# Patient Record
Sex: MALE | Race: WHITE | Employment: UNEMPLOYED | URBAN - METROPOLITAN AREA
[De-identification: names, ages, dates, MRNs, and addresses within clinical notes are randomized per-mention and may not be internally consistent; named-entity substitution may affect disease eponyms.]

---

## 2022-07-24 ENCOUNTER — HOSPITAL ENCOUNTER (EMERGENCY)
Age: 52
Discharge: HOME | End: 2022-07-24
Attending: EMERGENCY MEDICINE | Admitting: EMERGENCY MEDICINE
Payer: MEDICAID

## 2022-07-24 VITALS
BODY MASS INDEX: 41.68 KG/M2 | OXYGEN SATURATION: 94 % | HEART RATE: 80 BPM | SYSTOLIC BLOOD PRESSURE: 148 MMHG | RESPIRATION RATE: 16 BRPM | TEMPERATURE: 98.6 F | DIASTOLIC BLOOD PRESSURE: 88 MMHG | HEIGHT: 68 IN | WEIGHT: 275 LBS

## 2022-07-24 DIAGNOSIS — S90.821A BLISTER (NONTHERMAL), RIGHT FOOT, INITIAL ENCOUNTER: Primary | ICD-10-CM

## 2022-07-24 PROCEDURE — 6360000002 HC RX W HCPCS: Performed by: EMERGENCY MEDICINE

## 2022-07-24 PROCEDURE — 99284 EMERGENCY DEPT VISIT MOD MDM: CPT

## 2022-07-24 PROCEDURE — 90714 TD VACC NO PRESV 7 YRS+ IM: CPT | Performed by: EMERGENCY MEDICINE

## 2022-07-24 PROCEDURE — 90471 IMMUNIZATION ADMIN: CPT | Performed by: EMERGENCY MEDICINE

## 2022-07-24 RX ORDER — CIPROFLOXACIN 500 MG/1
500 TABLET, FILM COATED ORAL 2 TIMES DAILY
Qty: 14 TABLET | Refills: 0 | Status: SHIPPED | OUTPATIENT
Start: 2022-07-24 | End: 2022-07-31

## 2022-07-24 RX ORDER — TETANUS AND DIPHTHERIA TOXOIDS ADSORBED 2; 2 [LF]/.5ML; [LF]/.5ML
0.5 INJECTION INTRAMUSCULAR
Status: COMPLETED | OUTPATIENT
Start: 2022-07-24 | End: 2022-07-24

## 2022-07-24 RX ORDER — DICLOFENAC SODIUM 75 MG/1
75 TABLET, DELAYED RELEASE ORAL 2 TIMES DAILY
Qty: 10 TABLET | Refills: 0 | Status: SHIPPED | OUTPATIENT
Start: 2022-07-24 | End: 2022-07-29

## 2022-07-24 RX ORDER — ATENOLOL AND CHLORTHALIDONE TABLET 50; 25 MG/1; MG/1
1 TABLET ORAL DAILY
COMMUNITY

## 2022-07-24 RX ADMIN — TETANUS AND DIPHTHERIA TOXOIDS ADSORBED 0.5 ML: 2; 2 INJECTION INTRAMUSCULAR at 13:46

## 2022-07-24 ASSESSMENT — PAIN - FUNCTIONAL ASSESSMENT
PAIN_FUNCTIONAL_ASSESSMENT: 0-10
PAIN_FUNCTIONAL_ASSESSMENT: 0-10

## 2022-07-24 ASSESSMENT — PAIN DESCRIPTION - LOCATION
LOCATION: FOOT
LOCATION: FOOT

## 2022-07-24 ASSESSMENT — ENCOUNTER SYMPTOMS
BACK PAIN: 0
VOMITING: 0
SHORTNESS OF BREATH: 0

## 2022-07-24 ASSESSMENT — PAIN SCALES - GENERAL
PAINLEVEL_OUTOF10: 5
PAINLEVEL_OUTOF10: 5

## 2022-07-24 ASSESSMENT — PAIN DESCRIPTION - DESCRIPTORS: DESCRIPTORS: DISCOMFORT

## 2022-07-24 ASSESSMENT — PAIN DESCRIPTION - ORIENTATION
ORIENTATION: RIGHT
ORIENTATION: RIGHT

## 2022-07-24 NOTE — ED TRIAGE NOTES
Pt presents to the ED with c/o foot pain/skin issue. [place] and is unsure if he hurt his foot then. HE states he has nerve damage in his feet so he didn't realize he had an issue until he looked at the bottom of his foot yesterday and saw he had a huge blister looking are that had busted open. Denies fever. Masked.

## 2022-07-24 NOTE — ED NOTES
SKIN FROM RUPTURED BLISTERS ON BOTTOM OF RIGHT FOOT TRIMMED OFF PER DR SHELTON'S INSTRUCTIONS. NON-ADHERENT DRESSING APPLIED TO BOTH FEET.      Zoe Granados RN  07/24/22 1400

## 2022-07-24 NOTE — ED PROVIDER NOTES
[facility] Emergency Department Provider Note                   PCP:                Elvis Gerardo MD               Age: 54 y.o. Sex: male       ICD-10-CM    1. Blister (nonthermal), right foot, initial encounter  S90.821A           DISPOSITION Decision To Discharge 07/24/2022 01:30:50 PM        MDM  Number of Diagnoses or Management Options  Blister (nonthermal), right foot, initial encounter  Diagnosis management comments: Tetanus has been updated. Nurse staff has debrided the blister on the right foot. Dressing with Neosporin applied. Will place on Cipro due to having diabetes. Amount and/or Complexity of Data Reviewed  Tests in the medicine section of CPT®: ordered and reviewed    Risk of Complications, Morbidity, and/or Mortality  Presenting problems: low  Diagnostic procedures: low  Management options: low         Orders Placed This Encounter   Procedures    Apply dressing        Christi Tsai is a 54 y.o. male who presents to the Emergency Department with chief complaint of    Chief Complaint   Patient presents with    Foot Pain      14-year-old white male history of diabetes with neuropathy in his legs reports that Friday he spent several hours barefoot climbing through a creek in [place]. Today he noticed blisters on his feet and skin peeling off the bottom of his right foot. Tetanus status is unknown. The history is provided by the patient. Review of Systems   Constitutional:  Negative for fever. Respiratory:  Negative for shortness of breath. Gastrointestinal:  Negative for vomiting. Musculoskeletal:  Negative for back pain. Neurological:  Negative for headaches. All other systems reviewed and are negative. Past Medical History:   Diagnosis Date    Diabetes mellitus ([address]     Hyperlipidemia     Hypertension         Past Surgical History:   Procedure Laterality Date    HERNIA REPAIR      ORTHOPEDIC SURGERY          History reviewed.  No pertinent family history. Social History     Socioeconomic History    Marital status: Single     Spouse name: None    Number of children: None    Years of education: None    Highest education level: None   Tobacco Use    Smoking status: Every Day     Packs/day: 0.50     Types: Cigarettes    Smokeless tobacco: Never   Substance and Sexual Activity    Alcohol use: Never    Drug use: Never         Patient has no known allergies. Discharge Medication List as of 7/24/2022  2:01 PM        CONTINUE these medications which have NOT CHANGED    Details   atenolol-chlorthalidone (TENORETIC) 50-25 MG per tablet Take 1 tablet by mouth in the morning. Historical Med              Vitals signs and nursing note reviewed. No data found. Physical Exam  Vitals and nursing note reviewed. Constitutional:       General: He is not in acute distress. Appearance: Normal appearance. He is not toxic-appearing. HENT:      Head: Normocephalic and atraumatic. Cardiovascular:      Rate and Rhythm: Normal rate. Pulmonary:      Effort: Pulmonary effort is normal.   Musculoskeletal:      Cervical back: Normal range of motion. Comments: Patient has several intact blisters plantar aspect of his left foot. There is a large blister involving the entire anterior aspect of the plantar surface of the right foot. This blister has broken open. Skin:     General: Skin is warm and dry. Neurological:      Mental Status: He is alert and oriented to person, place, and time. Psychiatric:         Mood and Affect: Mood normal.         Behavior: Behavior normal.        Procedures      Labs Reviewed - No data to display     No orders to display                          Voice dictation software was used during the making of this note. This software is not perfect and grammatical and other typographical errors may be present. This note has not been completely proofread for errors.      Jaci Vázquez MD  07/24/22 0613

## 2022-07-24 NOTE — ED NOTES
I have reviewed discharge instructions with the patient. The patient verbalized understanding. Patient left ED via Discharge Method: ambulatory to Home with HIMSELF. Opportunity for questions and clarification provided. Patient given 2 scripts. To continue your aftercare when you leave the hospital, you may receive an automated call from our care team to check in on how you are doing. This is a free service and part of our promise to provide the best care and service to meet your aftercare needs.  If you have questions, or wish to unsubscribe from this service please call [phone]. Thank you for Choosing our [facility] Emergency Department.        Moshe Murillo RN  07/24/22 2572

## 2023-02-05 ENCOUNTER — HOSPITAL ENCOUNTER (EMERGENCY)
Age: 53
Discharge: HOME | End: 2023-02-06
Attending: EMERGENCY MEDICINE
Payer: MEDICAID

## 2023-02-05 VITALS
HEIGHT: 68 IN | WEIGHT: 270 LBS | RESPIRATION RATE: 20 BRPM | TEMPERATURE: 98.4 F | BODY MASS INDEX: 40.92 KG/M2 | OXYGEN SATURATION: 97 % | SYSTOLIC BLOOD PRESSURE: 156 MMHG | HEART RATE: 90 BPM | DIASTOLIC BLOOD PRESSURE: 108 MMHG

## 2023-02-05 DIAGNOSIS — M25.571 CHRONIC PAIN OF RIGHT ANKLE: Primary | ICD-10-CM

## 2023-02-05 DIAGNOSIS — G89.29 CHRONIC PAIN OF RIGHT ANKLE: Primary | ICD-10-CM

## 2023-02-05 LAB
GLUCOSE BLD STRIP.AUTO-MCNC: 147 MG/DL (ref 65–100)
SERVICE CMNT-IMP: ABNORMAL

## 2023-02-05 PROCEDURE — 82962 GLUCOSE BLOOD TEST: CPT

## 2023-02-05 PROCEDURE — 99283 EMERGENCY DEPT VISIT LOW MDM: CPT

## 2023-02-05 ASSESSMENT — PAIN DESCRIPTION - PAIN TYPE: TYPE: ACUTE PAIN

## 2023-02-05 ASSESSMENT — PAIN SCALES - GENERAL: PAINLEVEL_OUTOF10: 10

## 2023-02-05 ASSESSMENT — PAIN DESCRIPTION - DESCRIPTORS: DESCRIPTORS: ACHING

## 2023-02-05 ASSESSMENT — PAIN - FUNCTIONAL ASSESSMENT: PAIN_FUNCTIONAL_ASSESSMENT: 0-10

## 2023-02-05 ASSESSMENT — PAIN DESCRIPTION - LOCATION: LOCATION: ANKLE

## 2023-02-05 ASSESSMENT — PAIN DESCRIPTION - FREQUENCY: FREQUENCY: CONTINUOUS

## 2023-02-05 ASSESSMENT — PAIN DESCRIPTION - ORIENTATION: ORIENTATION: RIGHT

## 2023-02-05 ASSESSMENT — LIFESTYLE VARIABLES: HOW OFTEN DO YOU HAVE A DRINK CONTAINING ALCOHOL: NEVER

## 2023-02-06 ENCOUNTER — APPOINTMENT (OUTPATIENT)
Dept: GENERAL RADIOLOGY | Age: 53
End: 2023-02-06
Payer: MEDICAID

## 2023-02-06 PROBLEM — M25.571 CHRONIC PAIN OF RIGHT ANKLE: Status: ACTIVE | Noted: 2023-02-06

## 2023-02-06 PROBLEM — G89.29 CHRONIC PAIN OF RIGHT ANKLE: Status: ACTIVE | Noted: 2023-02-06

## 2023-02-06 PROCEDURE — 73610 X-RAY EXAM OF ANKLE: CPT

## 2023-02-06 PROCEDURE — 73630 X-RAY EXAM OF FOOT: CPT

## 2023-02-06 RX ORDER — MELOXICAM 7.5 MG/1
7.5 TABLET ORAL 2 TIMES DAILY PRN
Qty: 30 TABLET | Refills: 0 | Status: SHIPPED | OUTPATIENT
Start: 2023-02-06

## 2023-02-06 ASSESSMENT — ENCOUNTER SYMPTOMS
EYE DISCHARGE: 0
DIARRHEA: 0
COUGH: 0
CHEST TIGHTNESS: 0
NAUSEA: 0
ABDOMINAL PAIN: 0
SHORTNESS OF BREATH: 0
VOMITING: 0
BACK PAIN: 0

## 2023-02-06 NOTE — ED TRIAGE NOTES
Ambulatory with difficulty into triage. Reports right ankle pain x1 week. Unknown injury however reports \"My balance is bad and I fall a lot\". Denies other injury from falls. Denies blood thinners. HTN on arrival, non-compliant with meds.  Also reports non-compliant with insulin

## 2023-02-06 NOTE — ED PROVIDER NOTES
Emergency Department Provider Note                   PCP:                Chanell John MD               Age: 50 y.o. Sex: male       ICD-10-CM    1. Chronic pain of right ankle  M25.571     G89.29           DISPOSITION Decision To Discharge 02/06/2023 01:59:26 AM        Medical Decision Making  82-year-old male presents emerged department via private vehicle with chief complaint of chronic right ankle pain. He reports acute on chronic pain for the past 1 week in duration. He denies any alleviating aggravating factors. Symptoms been constant. He reports having pretty severe neuropathy and has complete numbness in the foot so is unsure if he has had any breaks he reports multiple falls this week. Vital signs here are reviewed. Patient is neurovascular intact. I cannot elicit any pinpoint tenderness. X-rays of the ankle and foot are obtained. These were reviewed by myself and agrees with the radiologist which showed no evidence of any acute fracture or dislocation. He does have an old healing medial malleolus fracture. Patient is ambulatory for us here in the emergency department. I have offered to wrap his foot but he declined. At this point told him he should follow-up with his primary care doctor and could potentially be on the care of a podiatrist.  Patient was given return precautions patient stable discharge examination. Amount and/or Complexity of Data Reviewed  Labs: ordered. Radiology: ordered. Complexity of Problem: 1 or more chronic illnesses with severe exacerbation. (5)  The patients assessment required an independent historian: none  I have conducted an independent ordering and review of X-rays. I have reviewed records from an external source: ED records from outside this hospital.  I have reviewed records from an external source: provider visit notes from PCP.   I have reviewed records from an external source: provider visit notes from outside specialist.  Considerations: Shared decision making was utilized in the care of this patient. Social determinant affecting care: none  Evidence based risk calculation performed: [place] ankle   I discussed case with patient  Home with pcp follow up          Orders Placed This Encounter   Procedures    XR ANKLE RIGHT (MIN 3 VIEWS)    XR FOOT RIGHT (MIN 3 VIEWS)    POCT Glucose    POCT Glucose        Medications - No data to display    New Prescriptions    No medications on file        Angelica Ware is a 50 y.o. male who presents to the Emergency Department with chief complaint of    Chief Complaint   Patient presents with    Ankle Pain      62-year-old male presents emerged department via private vehicle with chief complaint of chronic right ankle pain. He reports acute on chronic pain for the past 1 week in duration. He denies any alleviating aggravating factors. Symptoms been constant. He reports having pretty severe neuropathy and has complete numbness in the foot so is unsure if he has had any breaks he reports multiple falls this week. Review of Systems   Constitutional:  Negative for chills, fatigue and fever. HENT:  Negative for congestion, dental problem and drooling. Eyes:  Negative for discharge. Respiratory:  Negative for cough, chest tightness and shortness of breath. Cardiovascular:  Negative for chest pain and palpitations. Gastrointestinal:  Negative for abdominal pain, diarrhea, nausea and vomiting. Endocrine: Negative for polydipsia. Genitourinary:  Negative for difficulty urinating, dysuria and hematuria. Musculoskeletal:  Negative for back pain. Positive right foot and ankle pain. Skin:  Negative for rash and wound. Neurological:  Negative for dizziness, seizures, speech difficulty, light-headedness and headaches. Psychiatric/Behavioral:  Negative for agitation.       Past Medical History:   Diagnosis Date    Diabetes mellitus ([facility] [address]     Hyperlipidemia Hypertension         Past Surgical History:   Procedure Laterality Date    HERNIA REPAIR      ORTHOPEDIC SURGERY          No family history on file. Social History     Socioeconomic History    Marital status:    Tobacco Use    Smoking status: Every Day     Packs/day: 0.50     Types: Cigarettes    Smokeless tobacco: Never   Substance and Sexual Activity    Alcohol use: Never    Drug use: Never         Patient has no known allergies. Previous Medications    ATENOLOL-CHLORTHALIDONE (TENORETIC) 50-25 MG PER TABLET    Take 1 tablet by mouth in the morning. DICLOFENAC (VOLTAREN) 75 MG EC TABLET    Take 1 tablet by mouth in the morning and 1 tablet before bedtime. Do all this for 5 days. Vitals signs and nursing note reviewed. Patient Vitals for the past 4 hrs:   Temp Pulse Resp BP SpO2   02/05/23 2329 98.4 °F (36.9 °C) 90 20 (!) 156/108 97 %          Physical Exam  Vitals and nursing note reviewed. Constitutional:       Appearance: Normal appearance. HENT:      Head: Normocephalic and atraumatic. Right Ear: Tympanic membrane normal.      Nose: Nose normal.      Mouth/Throat:      Mouth: Mucous membranes are moist.   Eyes:      Extraocular Movements: Extraocular movements intact. Pupils: Pupils are equal, round, and reactive to light. Cardiovascular:      Rate and Rhythm: Normal rate and regular rhythm. Pulses: Normal pulses. Heart sounds: No murmur heard. Pulmonary:      Effort: Pulmonary effort is normal. No respiratory distress. Breath sounds: Normal breath sounds. Abdominal:      General: There is no distension. Palpations: Abdomen is soft. Tenderness: There is no abdominal tenderness. There is no guarding. Musculoskeletal:         General: No swelling or tenderness. Normal range of motion. Cervical back: Normal range of motion and neck supple. No rigidity or tenderness. Skin:     General: Skin is warm and dry.       Capillary Refill: Capillary refill takes less than 2 seconds. Neurological:      General: No focal deficit present. Mental Status: He is alert and oriented to person, place, and time. Mental status is at baseline. Psychiatric:         Behavior: Behavior normal.        Procedures    Results for orders placed or performed during the hospital encounter of 02/05/23   XR ANKLE RIGHT (MIN 3 VIEWS)    Narrative    EXAMINATION: Right foot    HISTORY: right ankle and foot pain, diabetic, multiple falls this week. TECHNIQUE: Frontal, lateral, and oblique views of the right foot. COMPARISON: None available. FINDINGS:   There is no evidence of acute fracture or dislocation. Joint spaces are maintained. Soft tissues are within normal limits. No radiopaque foreign body. Impression    No evidence of acute fracture or dislocation within the right foot. EXAMINATION: Right Ankle    HISTORY: right ankle and foot pain, diabetic, multiple falls this week. TECHNIQUE: Frontal, lateral, and oblique views of the right ankle. COMPARISON: None available. FINDINGS:   There is no evidence of acute fracture or dislocation. The appearance of the medial malleolus suggests a healed remote fracture. Joint spaces are maintained. There is mild soft tissue swelling. No radiopaque foreign body. IMPRESSION:  No evidence of acute fracture or dislocation within the right ankle. Mild soft  tissue swelling. XR FOOT RIGHT (MIN 3 VIEWS)    Narrative    EXAMINATION: Right foot    HISTORY: right ankle and foot pain, diabetic, multiple falls this week. TECHNIQUE: Frontal, lateral, and oblique views of the right foot. COMPARISON: None available. FINDINGS:   There is no evidence of acute fracture or dislocation. Joint spaces are maintained. Soft tissues are within normal limits. No radiopaque foreign body.       Impression    No evidence of acute fracture or dislocation within the right foot.                EXAMINATION: Right Ankle    HISTORY: right ankle and foot pain, diabetic, multiple falls this week. TECHNIQUE: Frontal, lateral, and oblique views of the right ankle. COMPARISON: None available. FINDINGS:   There is no evidence of acute fracture or dislocation. The appearance of the medial malleolus suggests a healed remote fracture. Joint spaces are maintained. There is mild soft tissue swelling. No radiopaque foreign body. IMPRESSION:  No evidence of acute fracture or dislocation within the right ankle. Mild soft  tissue swelling. POCT Glucose   Result Value Ref Range    POC Glucose 147 (H) 65 - 100 mg/dL    Performed by: Gianna         XR ANKLE RIGHT (MIN 3 VIEWS)   Final Result   No evidence of acute fracture or dislocation within the right foot. EXAMINATION: Right Ankle      HISTORY: right ankle and foot pain, diabetic, multiple falls this week. TECHNIQUE: Frontal, lateral, and oblique views of the right ankle. COMPARISON: None available. FINDINGS:    There is no evidence of acute fracture or dislocation. The appearance of the medial malleolus suggests a healed remote fracture. Joint spaces are maintained. There is mild soft tissue swelling. No radiopaque foreign body. IMPRESSION:   No evidence of acute fracture or dislocation within the right ankle. Mild soft   tissue swelling. XR FOOT RIGHT (MIN 3 VIEWS)   Final Result   No evidence of acute fracture or dislocation within the right foot. EXAMINATION: Right Ankle      HISTORY: right ankle and foot pain, diabetic, multiple falls this week. TECHNIQUE: Frontal, lateral, and oblique views of the right ankle. COMPARISON: None available. FINDINGS:    There is no evidence of acute fracture or dislocation. The appearance of the medial malleolus suggests a healed remote fracture. Joint spaces are maintained. There is mild soft tissue swelling. No radiopaque foreign body. IMPRESSION:   No evidence of acute fracture or dislocation within the right ankle. Mild soft   tissue swelling. Voice dictation software was used during the making of this note. This software is not perfect and grammatical and other typographical errors may be present. This note has not been completely proofread for errors.      Sylvia Bravo,   02/06/23 0201

## 2023-02-06 NOTE — DISCHARGE INSTRUCTIONS
No evidence of any fractures on your x-rays here. Please follow-up with your primary care doctor you should also probably be under the care of a podiatrist if you are having chronic foot and ankle pain.

## 2023-02-06 NOTE — ED NOTES
I have reviewed discharge instructions with the patient. The patient verbalized understanding. Patient left ED via Discharge Method: ambulatory to Home with ( self). Opportunity for questions and clarification provided. Patient given 1 scripts. To continue your aftercare when you leave the hospital, you may receive an automated call from our care team to check in on how you are doing. This is a free service and part of our promise to provide the best care and service to meet your aftercare needs.  If you have questions, or wish to unsubscribe from this service please call [phone]. Thank you for Choosing our [facility] Emergency Department.        Ronnie Uriarte RN  02/06/23 1351

## 2023-05-22 ENCOUNTER — HOSPITAL ENCOUNTER (EMERGENCY)
Age: 53
Discharge: HOME | End: 2023-05-22
Attending: EMERGENCY MEDICINE
Payer: MEDICAID

## 2023-05-22 ENCOUNTER — APPOINTMENT (OUTPATIENT)
Dept: GENERAL RADIOLOGY | Age: 53
End: 2023-05-22
Payer: MEDICAID

## 2023-05-22 VITALS
WEIGHT: 270 LBS | HEIGHT: 66 IN | OXYGEN SATURATION: 99 % | DIASTOLIC BLOOD PRESSURE: 93 MMHG | SYSTOLIC BLOOD PRESSURE: 154 MMHG | TEMPERATURE: 98.4 F | BODY MASS INDEX: 43.39 KG/M2 | RESPIRATION RATE: 17 BRPM | HEART RATE: 91 BPM

## 2023-05-22 DIAGNOSIS — M19.021 OSTEOARTHRITIS OF RIGHT ELBOW, UNSPECIFIED OSTEOARTHRITIS TYPE: Primary | ICD-10-CM

## 2023-05-22 PROBLEM — I10 HYPERTENSION: Status: ACTIVE | Noted: 2017-06-16

## 2023-05-22 PROBLEM — M47.816 LUMBAR FACET ARTHROPATHY: Status: ACTIVE | Noted: 2023-05-22

## 2023-05-22 PROBLEM — M79.2 NEURALGIA AND NEURITIS, UNSPECIFIED: Status: ACTIVE | Noted: 2023-05-22

## 2023-05-22 PROBLEM — M54.12 CERVICAL RADICULOPATHY: Status: ACTIVE | Noted: 2023-05-22

## 2023-05-22 PROCEDURE — 6360000002 HC RX W HCPCS

## 2023-05-22 PROCEDURE — 96372 THER/PROPH/DIAG INJ SC/IM: CPT

## 2023-05-22 PROCEDURE — 99284 EMERGENCY DEPT VISIT MOD MDM: CPT

## 2023-05-22 PROCEDURE — 6370000000 HC RX 637 (ALT 250 FOR IP)

## 2023-05-22 PROCEDURE — 73080 X-RAY EXAM OF ELBOW: CPT

## 2023-05-22 RX ORDER — ONDANSETRON 4 MG/1
4 TABLET, ORALLY DISINTEGRATING ORAL
Status: COMPLETED | OUTPATIENT
Start: 2023-05-22 | End: 2023-05-22

## 2023-05-22 RX ORDER — PREDNISONE 20 MG/1
20 TABLET ORAL DAILY
Qty: 5 TABLET | Refills: 0 | Status: SHIPPED | OUTPATIENT
Start: 2023-05-22 | End: 2023-05-27

## 2023-05-22 RX ORDER — KETOROLAC TROMETHAMINE 30 MG/ML
30 INJECTION, SOLUTION INTRAMUSCULAR; INTRAVENOUS
Status: COMPLETED | OUTPATIENT
Start: 2023-05-22 | End: 2023-05-22

## 2023-05-22 RX ORDER — MORPHINE SULFATE 4 MG/ML
2 INJECTION INTRAVENOUS ONCE
Status: COMPLETED | OUTPATIENT
Start: 2023-05-22 | End: 2023-05-22

## 2023-05-22 RX ORDER — NAPROXEN 500 MG/1
500 TABLET ORAL 2 TIMES DAILY
Qty: 14 TABLET | Refills: 0 | Status: SHIPPED | OUTPATIENT
Start: 2023-05-22 | End: 2023-05-29

## 2023-05-22 RX ORDER — KETOROLAC TROMETHAMINE 30 MG/ML
30 INJECTION, SOLUTION INTRAMUSCULAR; INTRAVENOUS ONCE
Status: DISCONTINUED | OUTPATIENT
Start: 2023-05-22 | End: 2023-05-22

## 2023-05-22 RX ADMIN — MORPHINE SULFATE 2 MG: 4 INJECTION INTRAVENOUS at 22:48

## 2023-05-22 RX ADMIN — ONDANSETRON 4 MG: 4 TABLET, ORALLY DISINTEGRATING ORAL at 22:44

## 2023-05-22 RX ADMIN — KETOROLAC TROMETHAMINE 30 MG: 30 INJECTION, SOLUTION INTRAMUSCULAR at 22:49

## 2023-05-22 ASSESSMENT — PAIN SCALES - GENERAL
PAINLEVEL_OUTOF10: 10
PAINLEVEL_OUTOF10: 4
PAINLEVEL_OUTOF10: 10

## 2023-05-22 ASSESSMENT — PAIN DESCRIPTION - ORIENTATION
ORIENTATION: RIGHT

## 2023-05-22 ASSESSMENT — PAIN DESCRIPTION - LOCATION
LOCATION: ELBOW

## 2023-05-22 ASSESSMENT — PAIN - FUNCTIONAL ASSESSMENT: PAIN_FUNCTIONAL_ASSESSMENT: 0-10

## 2023-05-23 NOTE — ED NOTES
I have reviewed discharge instructions with the patient. The patient verbalized understanding. Patient left ED via Discharge Method: ambulatory to Home with self    Opportunity for questions and clarification provided. Patient given 2 scripts. To continue your aftercare when you leave the hospital, you may receive an automated call from our care team to check in on how you are doing. This is a free service and part of our promise to provide the best care and service to meet your aftercare needs.  If you have questions, or wish to unsubscribe from this service please call [phone]. Thank you for Choosing our [facility] Emergency Department.         Nini Mistry RN  05/22/23 0892

## 2023-05-23 NOTE — ED PROVIDER NOTES
Emergency Department Provider Note       PCP: Johan Yeung MD   Age: 50 y.o. Sex: male     DISPOSITION Decision To Discharge 05/22/2023 10:48:26 PM       ICD-10-CM    1. Osteoarthritis of right elbow, unspecified osteoarthritis type  M19.021 naproxen (NAPROSYN) 500 MG tablet     predniSONE (DELTASONE) 20 MG tablet          Medical Decision Making     Complexity of Problems Addressed:  Complexity of Problem: 1 acute, uncomplicated illness or injury. Data Reviewed and Analyzed:  Category 1:   I independently ordered and reviewed each unique test.  I reviewed external records: ED visit note from an outside group. I reviewed external records: previous imaging study including radiologist interpretation. Category 2:   I interpreted the X-rays. X-ray right elbow is negative for fracture or dislocation or acute bony abnormality. I am in agreement with radiologist interpretation    Category 3: Discussion of management or test interpretation. Vital signs reviewed, patient stable, NAD, afebrile, nontoxic in appearance     Patient's blood pressure is elevated today at 170/101. He has not taken any blood pressure medications today. Denies any chest pain or shortness of breath headache or dizziness. Asymptomatic hypertension    In summary this is a 56-year-old male with chronic bilateral upper extremity pain, cervicalgia who presents emergency department today with chief complaint of posterior right elbow pain for the past week. Patient denies any known injury or trauma to the area. On physical exam.  Patient is neurovascularly intact. No erythema, no swelling, no induration or warmth to the joint.     Review of PDMP demonstrates that on 5 3 of this month, patient was prescribed a 30-day supply of 20 mg oxycodone and a quantity of 90    We will obtain x-rays of right elbow today as patient states he does have chronic falls due to his chronic neuralgia and neuritis    We will administer IM Toradol

## 2023-05-23 NOTE — ED TRIAGE NOTES
Pt ambulatory to triage with steady gait. Pt c/o right elbow pain that radiates down his arm. States pain started alittle over a weak ago, pt reports pain has been getting \"worse and worse\". Pt states he has nerve damage and falls often, but is unable to relate his current arm pain to any recent particular fall. Pt rates elbow pain 10/10 pain.

## 2023-05-23 NOTE — DISCHARGE INSTRUCTIONS
You were evaluated in the emergency department today for right elbow pain    X-rays of your elbow are negative for fracture. You do have osteoarthritis of your right elbow    No indication of infection in your elbow. Your physical exam is reassuring. You are given injection of morphine and Toradol here in the emergency department along with some Zofran for nausea. Your wife is to be driving him home today    I have written you a prescription for an anti-inflammatory that is an NSAID called Naprosyn. Take this medication twice daily with food do not take other NSAIDs such as ibuprofen, Motrin, Aleve, Mobic while taking this medication    I have written you a low-dose steroid called prednisone to be started tomorrow.     Contact your primary care provider and your neurologic provider tomorrow to schedule follow-up within the next week    Return the emergency department for chest pain, shortness of breath, signs and symptoms of stroke as listed in your discharge paperwork, swelling or redness or warmth to your elbow along with fevers

## 2025-04-30 ENCOUNTER — APPOINTMENT (OUTPATIENT)
Dept: GENERAL RADIOLOGY | Age: 55
End: 2025-04-30
Payer: MEDICAID

## 2025-04-30 ENCOUNTER — HOSPITAL ENCOUNTER (INPATIENT)
Age: 55
LOS: 3 days | Discharge: HOME | End: 2025-05-03
Attending: EMERGENCY MEDICINE | Admitting: INTERNAL MEDICINE
Payer: MEDICAID

## 2025-04-30 DIAGNOSIS — J18.9 PNEUMONIA OF LEFT LOWER LOBE DUE TO INFECTIOUS ORGANISM: ICD-10-CM

## 2025-04-30 DIAGNOSIS — E87.1 HYPONATREMIA: ICD-10-CM

## 2025-04-30 DIAGNOSIS — A41.9 SEPSIS, DUE TO UNSPECIFIED ORGANISM, UNSPECIFIED WHETHER ACUTE ORGAN DYSFUNCTION PRESENT (HCC): Primary | ICD-10-CM

## 2025-04-30 DIAGNOSIS — R74.01 ELEVATED AST (SGOT): ICD-10-CM

## 2025-04-30 PROBLEM — R31.9 HEMATURIA: Status: ACTIVE | Noted: 2025-04-30

## 2025-04-30 PROBLEM — J15.9 BACTERIAL PNEUMONIA: Status: ACTIVE | Noted: 2025-04-30

## 2025-04-30 PROBLEM — N39.0 UTI (URINARY TRACT INFECTION): Status: ACTIVE | Noted: 2025-04-30

## 2025-04-30 PROBLEM — D69.6 THROMBOCYTOPENIA: Status: ACTIVE | Noted: 2025-04-30

## 2025-04-30 LAB
ACB COMPLEX DNA BLD POS QL NAA+NON-PROBE: NOT DETECTED
ACCESSION NUMBER, LLC1M: ABNORMAL
ALBUMIN SERPL-MCNC: 2.9 G/DL (ref 3.5–5)
ALBUMIN/GLOB SERPL: 0.7 (ref 1–1.9)
ALP SERPL-CCNC: 77 U/L (ref 40–129)
ALT SERPL-CCNC: 39 U/L (ref 8–55)
ANION GAP SERPL CALC-SCNC: 14 MMOL/L (ref 7–16)
APPEARANCE UR: CLEAR
AST SERPL-CCNC: 68 U/L (ref 15–37)
B FRAGILIS DNA BLD POS QL NAA+NON-PROBE: NOT DETECTED
B PERT DNA SPEC QL NAA+PROBE: NOT DETECTED
BACTERIA URNS QL MICRO: NEGATIVE /HPF
BASOPHILS # BLD: 0.05 K/UL (ref 0–0.2)
BASOPHILS NFR BLD: 0.4 % (ref 0–2)
BILIRUB SERPL-MCNC: 0.8 MG/DL (ref 0–1.2)
BILIRUB UR QL: NEGATIVE
BIOFIRE TEST COMMENT: ABNORMAL
BLACTX-M ISLT/SPM QL: NOT DETECTED
BLAIMP ISLT/SPM QL: NOT DETECTED
BLAKPC ISLT/SPM QL: NOT DETECTED
BLAOXA-48-LIKE ISLT/SPM QL: NOT DETECTED
BLAVIM ISLT/SPM QL: NOT DETECTED
BORDETELLA PARAPERTUSSIS BY PCR: NOT DETECTED
BUN SERPL-MCNC: 18 MG/DL (ref 6–23)
C ALBICANS DNA BLD POS QL NAA+NON-PROBE: NOT DETECTED
C AURIS DNA BLD POS QL NAA+NON-PROBE: NOT DETECTED
C GATTII+NEOFOR DNA BLD POS QL NAA+N-PRB: NOT DETECTED
C GLABRATA DNA BLD POS QL NAA+NON-PROBE: NOT DETECTED
C KRUSEI DNA BLD POS QL NAA+NON-PROBE: NOT DETECTED
C PARAP DNA BLD POS QL NAA+NON-PROBE: NOT DETECTED
C PNEUM DNA SPEC QL NAA+PROBE: NOT DETECTED
C TROPICLS DNA BLD POS QL NAA+NON-PROBE: NOT DETECTED
CALCIUM SERPL-MCNC: 8.6 MG/DL (ref 8.8–10.2)
CHLORIDE SERPL-SCNC: 101 MMOL/L (ref 98–107)
CO2 SERPL-SCNC: 20 MMOL/L (ref 20–29)
COLOR UR: ABNORMAL
CREAT SERPL-MCNC: 0.99 MG/DL (ref 0.8–1.3)
DIFFERENTIAL METHOD BLD: ABNORMAL
E CLOAC COMP DNA BLD POS NAA+NON-PROBE: NOT DETECTED
E COLI DNA BLD POS QL NAA+NON-PROBE: NOT DETECTED
E FAECALIS DNA BLD POS QL NAA+NON-PROBE: NOT DETECTED
E FAECIUM DNA BLD POS QL NAA+NON-PROBE: NOT DETECTED
EKG ATRIAL RATE: 123 BPM
EKG DIAGNOSIS: NORMAL
EKG P AXIS: 62 DEGREES
EKG P-R INTERVAL: 148 MS
EKG Q-T INTERVAL: 323 MS
EKG QRS DURATION: 86 MS
EKG QTC CALCULATION (BAZETT): 461 MS
EKG R AXIS: -11 DEGREES
EKG T AXIS: 91 DEGREES
EKG VENTRICULAR RATE: 122 BPM
ENTEROBACTERALES DNA BLD POS NAA+N-PRB: DETECTED
EOSINOPHIL # BLD: 0 K/UL (ref 0–0.8)
EOSINOPHIL NFR BLD: 0 % (ref 0.5–7.8)
EPI CELLS #/AREA URNS HPF: ABNORMAL /HPF
ERYTHROCYTE [DISTWIDTH] IN BLOOD BY AUTOMATED COUNT: 15.2 % (ref 11.9–14.6)
FLUAV SUBTYP SPEC NAA+PROBE: NOT DETECTED
FLUBV RNA SPEC QL NAA+PROBE: NOT DETECTED
GLOBULIN SER CALC-MCNC: 4.1 G/DL (ref 2.3–3.5)
GLUCOSE SERPL-MCNC: 140 MG/DL (ref 70–99)
GLUCOSE UR STRIP.AUTO-MCNC: NEGATIVE MG/DL
GP B STREP DNA BLD POS QL NAA+NON-PROBE: NOT DETECTED
HADV DNA SPEC QL NAA+PROBE: NOT DETECTED
HAEM INFLU DNA BLD POS QL NAA+NON-PROBE: NOT DETECTED
HCOV 229E RNA SPEC QL NAA+PROBE: NOT DETECTED
HCOV HKU1 RNA SPEC QL NAA+PROBE: NOT DETECTED
HCOV NL63 RNA SPEC QL NAA+PROBE: NOT DETECTED
HCOV OC43 RNA SPEC QL NAA+PROBE: NOT DETECTED
HCT VFR BLD AUTO: 44.4 % (ref 41.1–50.3)
HGB BLD-MCNC: 15.1 G/DL (ref 13.6–17.2)
HGB UR QL STRIP: ABNORMAL
HMPV RNA SPEC QL NAA+PROBE: NOT DETECTED
HPIV1 RNA SPEC QL NAA+PROBE: NOT DETECTED
HPIV2 RNA SPEC QL NAA+PROBE: NOT DETECTED
HPIV3 RNA SPEC QL NAA+PROBE: NOT DETECTED
HPIV4 RNA SPEC QL NAA+PROBE: NOT DETECTED
HYALINE CASTS URNS QL MICRO: ABNORMAL /LPF
IMM GRANULOCYTES # BLD AUTO: 0.1 K/UL (ref 0–0.5)
IMM GRANULOCYTES NFR BLD AUTO: 0.8 % (ref 0–5)
K OXYTOCA DNA BLD POS QL NAA+NON-PROBE: NOT DETECTED
KETONES UR QL STRIP.AUTO: 15 MG/DL
KLEBSIELLA SP DNA BLD POS QL NAA+NON-PRB: NOT DETECTED
KLEBSIELLA SP DNA BLD POS QL NAA+NON-PRB: NOT DETECTED
L MONOCYTOG DNA BLD POS QL NAA+NON-PROBE: NOT DETECTED
LACTATE SERPL-SCNC: 1.9 MMOL/L (ref 0.5–2)
LACTATE SERPL-SCNC: 2 MMOL/L (ref 0.5–2)
LACTATE SERPL-SCNC: 3.2 MMOL/L (ref 0.5–2)
LEUKOCYTE ESTERASE UR QL STRIP.AUTO: ABNORMAL
LYMPHOCYTES # BLD: 0.25 K/UL (ref 0.5–4.6)
LYMPHOCYTES NFR BLD: 2 % (ref 13–44)
M PNEUMO DNA SPEC QL NAA+PROBE: NOT DETECTED
MCH RBC QN AUTO: 25 PG (ref 26.1–32.9)
MCHC RBC AUTO-ENTMCNC: 34 G/DL (ref 31.4–35)
MCV RBC AUTO: 73.4 FL (ref 82–102)
MONOCYTES # BLD: 0.17 K/UL (ref 0.1–1.3)
MONOCYTES NFR BLD: 1.3 % (ref 4–12)
N MEN DNA BLD POS QL NAA+NON-PROBE: NOT DETECTED
NEUTS SEG # BLD: 12.11 K/UL (ref 1.7–8.2)
NEUTS SEG NFR BLD: 95.5 % (ref 43–78)
NITRITE UR QL STRIP.AUTO: NEGATIVE
NRBC # BLD: 0 K/UL (ref 0–0.2)
P AERUGINOSA DNA BLD POS NAA+NON-PROBE: NOT DETECTED
PH UR STRIP: 7 (ref 5–9)
PLATELET # BLD AUTO: 113 K/UL (ref 150–450)
PMV BLD AUTO: 11.7 FL (ref 9.4–12.3)
POTASSIUM SERPL-SCNC: 3.5 MMOL/L (ref 3.5–5.1)
PROCALCITONIN SERPL-MCNC: 14.4 NG/ML (ref 0–0.1)
PROT SERPL-MCNC: 7 G/DL (ref 6.3–8.2)
PROT UR STRIP-MCNC: 300 MG/DL
PROTEUS SP DNA BLD POS QL NAA+NON-PROBE: NOT DETECTED
RBC # BLD AUTO: 6.05 M/UL (ref 4.23–5.6)
RBC #/AREA URNS HPF: ABNORMAL /HPF
RESISTANT GENE NDM BY PCR: NOT DETECTED
RESISTANT GENE TARGETS: ABNORMAL
RSV RNA SPEC QL NAA+PROBE: NOT DETECTED
RV+EV RNA SPEC QL NAA+PROBE: NOT DETECTED
S AUREUS DNA BLD POS QL NAA+NON-PROBE: NOT DETECTED
S AUREUS+CONS DNA BLD POS NAA+NON-PROBE: NOT DETECTED
S EPIDERMIDIS DNA BLD POS QL NAA+NON-PRB: NOT DETECTED
S LUGDUNENSIS DNA BLD POS QL NAA+NON-PRB: NOT DETECTED
S MALTOPHILIA DNA BLD POS QL NAA+NON-PRB: NOT DETECTED
S MARCESCENS DNA BLD POS NAA+NON-PROBE: NOT DETECTED
S PNEUM DNA BLD POS QL NAA+NON-PROBE: NOT DETECTED
S PYO DNA BLD POS QL NAA+NON-PROBE: NOT DETECTED
SALMONELLA DNA BLD POS QL NAA+NON-PROBE: NOT DETECTED
SARS-COV-2 RNA RESP QL NAA+PROBE: NOT DETECTED
SODIUM SERPL-SCNC: 135 MMOL/L (ref 136–145)
SP GR UR REFRACTOMETRY: 1.02 (ref 1–1.02)
STREPTOCOCCUS DNA BLD POS NAA+NON-PROBE: NOT DETECTED
UROBILINOGEN UR QL STRIP.AUTO: 1 EU/DL (ref 0.2–1)
WBC # BLD AUTO: 12.7 K/UL (ref 4.3–11.1)
WBC URNS QL MICRO: ABNORMAL /HPF

## 2025-04-30 PROCEDURE — 87186 SC STD MICRODIL/AGAR DIL: CPT

## 2025-04-30 PROCEDURE — 87205 SMEAR GRAM STAIN: CPT

## 2025-04-30 PROCEDURE — 83605 ASSAY OF LACTIC ACID: CPT

## 2025-04-30 PROCEDURE — 96367 TX/PROPH/DG ADDL SEQ IV INF: CPT

## 2025-04-30 PROCEDURE — 6370000000 HC RX 637 (ALT 250 FOR IP): Performed by: INTERNAL MEDICINE

## 2025-04-30 PROCEDURE — 6360000002 HC RX W HCPCS: Performed by: INTERNAL MEDICINE

## 2025-04-30 PROCEDURE — 80053 COMPREHEN METABOLIC PANEL: CPT

## 2025-04-30 PROCEDURE — 6360000002 HC RX W HCPCS: Performed by: EMERGENCY MEDICINE

## 2025-04-30 PROCEDURE — 2500000003 HC RX 250 WO HCPCS: Performed by: INTERNAL MEDICINE

## 2025-04-30 PROCEDURE — 6370000000 HC RX 637 (ALT 250 FOR IP): Performed by: EMERGENCY MEDICINE

## 2025-04-30 PROCEDURE — 87077 CULTURE AEROBIC IDENTIFY: CPT

## 2025-04-30 PROCEDURE — 84145 PROCALCITONIN (PCT): CPT

## 2025-04-30 PROCEDURE — 94640 AIRWAY INHALATION TREATMENT: CPT

## 2025-04-30 PROCEDURE — 6370000000 HC RX 637 (ALT 250 FOR IP): Performed by: NURSE PRACTITIONER

## 2025-04-30 PROCEDURE — 71045 X-RAY EXAM CHEST 1 VIEW: CPT

## 2025-04-30 PROCEDURE — 87040 BLOOD CULTURE FOR BACTERIA: CPT

## 2025-04-30 PROCEDURE — 87154 CUL TYP ID BLD PTHGN 6+ TRGT: CPT

## 2025-04-30 PROCEDURE — 87086 URINE CULTURE/COLONY COUNT: CPT

## 2025-04-30 PROCEDURE — 0202U NFCT DS 22 TRGT SARS-COV-2: CPT

## 2025-04-30 PROCEDURE — 96365 THER/PROPH/DIAG IV INF INIT: CPT

## 2025-04-30 PROCEDURE — 99285 EMERGENCY DEPT VISIT HI MDM: CPT

## 2025-04-30 PROCEDURE — 96366 THER/PROPH/DIAG IV INF ADDON: CPT

## 2025-04-30 PROCEDURE — 81001 URINALYSIS AUTO W/SCOPE: CPT

## 2025-04-30 PROCEDURE — 2580000003 HC RX 258: Performed by: INTERNAL MEDICINE

## 2025-04-30 PROCEDURE — 2580000003 HC RX 258: Performed by: EMERGENCY MEDICINE

## 2025-04-30 PROCEDURE — 36415 COLL VENOUS BLD VENIPUNCTURE: CPT

## 2025-04-30 PROCEDURE — 93005 ELECTROCARDIOGRAM TRACING: CPT | Performed by: EMERGENCY MEDICINE

## 2025-04-30 PROCEDURE — 85025 COMPLETE CBC W/AUTO DIFF WBC: CPT

## 2025-04-30 PROCEDURE — 1100000003 HC PRIVATE W/ TELEMETRY

## 2025-04-30 PROCEDURE — 93010 ELECTROCARDIOGRAM REPORT: CPT | Performed by: INTERNAL MEDICINE

## 2025-04-30 PROCEDURE — 96361 HYDRATE IV INFUSION ADD-ON: CPT

## 2025-04-30 RX ORDER — SODIUM CHLORIDE 0.9 % (FLUSH) 0.9 %
5-40 SYRINGE (ML) INJECTION PRN
Status: DISCONTINUED | OUTPATIENT
Start: 2025-04-30 | End: 2025-05-03 | Stop reason: HOSPADM

## 2025-04-30 RX ORDER — ALBUTEROL SULFATE 0.83 MG/ML
2.5 SOLUTION RESPIRATORY (INHALATION) EVERY 4 HOURS PRN
Status: DISCONTINUED | OUTPATIENT
Start: 2025-04-30 | End: 2025-05-03 | Stop reason: HOSPADM

## 2025-04-30 RX ORDER — SODIUM CHLORIDE, SODIUM LACTATE, POTASSIUM CHLORIDE, AND CALCIUM CHLORIDE .6; .31; .03; .02 G/100ML; G/100ML; G/100ML; G/100ML
1000 INJECTION, SOLUTION INTRAVENOUS
Status: COMPLETED | OUTPATIENT
Start: 2025-04-30 | End: 2025-04-30

## 2025-04-30 RX ORDER — SODIUM CHLORIDE, SODIUM LACTATE, POTASSIUM CHLORIDE, CALCIUM CHLORIDE 600; 310; 30; 20 MG/100ML; MG/100ML; MG/100ML; MG/100ML
INJECTION, SOLUTION INTRAVENOUS CONTINUOUS
Status: ACTIVE | OUTPATIENT
Start: 2025-04-30 | End: 2025-05-01

## 2025-04-30 RX ORDER — ACETAMINOPHEN 650 MG/1
650 SUPPOSITORY RECTAL EVERY 6 HOURS PRN
Status: DISCONTINUED | OUTPATIENT
Start: 2025-04-30 | End: 2025-05-03 | Stop reason: HOSPADM

## 2025-04-30 RX ORDER — OXYCODONE HYDROCHLORIDE 5 MG/1
5 TABLET ORAL ONCE
Refills: 0 | Status: COMPLETED | OUTPATIENT
Start: 2025-04-30 | End: 2025-04-30

## 2025-04-30 RX ORDER — PREGABALIN 100 MG/1
100 CAPSULE ORAL 3 TIMES DAILY
Status: DISCONTINUED | OUTPATIENT
Start: 2025-04-30 | End: 2025-05-03 | Stop reason: HOSPADM

## 2025-04-30 RX ORDER — SODIUM CHLORIDE 9 MG/ML
INJECTION, SOLUTION INTRAVENOUS PRN
Status: DISCONTINUED | OUTPATIENT
Start: 2025-04-30 | End: 2025-05-03 | Stop reason: HOSPADM

## 2025-04-30 RX ORDER — ENOXAPARIN SODIUM 100 MG/ML
30 INJECTION SUBCUTANEOUS 2 TIMES DAILY
Status: DISCONTINUED | OUTPATIENT
Start: 2025-05-01 | End: 2025-05-03 | Stop reason: HOSPADM

## 2025-04-30 RX ORDER — NICOTINE 21 MG/24HR
1 PATCH, TRANSDERMAL 24 HOURS TRANSDERMAL DAILY
Status: DISCONTINUED | OUTPATIENT
Start: 2025-04-30 | End: 2025-05-03 | Stop reason: HOSPADM

## 2025-04-30 RX ORDER — ACETAMINOPHEN 325 MG/1
650 TABLET ORAL EVERY 6 HOURS PRN
Status: DISCONTINUED | OUTPATIENT
Start: 2025-04-30 | End: 2025-05-03 | Stop reason: HOSPADM

## 2025-04-30 RX ORDER — ACETAMINOPHEN 500 MG
1000 TABLET ORAL
Status: COMPLETED | OUTPATIENT
Start: 2025-04-30 | End: 2025-04-30

## 2025-04-30 RX ORDER — SODIUM CHLORIDE 0.9 % (FLUSH) 0.9 %
5-40 SYRINGE (ML) INJECTION EVERY 12 HOURS SCHEDULED
Status: DISCONTINUED | OUTPATIENT
Start: 2025-04-30 | End: 2025-05-03 | Stop reason: HOSPADM

## 2025-04-30 RX ORDER — ALBUTEROL SULFATE 0.83 MG/ML
2.5 SOLUTION RESPIRATORY (INHALATION)
Status: COMPLETED | OUTPATIENT
Start: 2025-04-30 | End: 2025-04-30

## 2025-04-30 RX ORDER — PREGABALIN 100 MG/1
100 CAPSULE ORAL 3 TIMES DAILY
Status: DISCONTINUED | OUTPATIENT
Start: 2025-04-30 | End: 2025-04-30 | Stop reason: SDUPTHER

## 2025-04-30 RX ADMIN — PREGABALIN 100 MG: 100 CAPSULE ORAL at 20:32

## 2025-04-30 RX ADMIN — PREGABALIN 100 MG: 100 CAPSULE ORAL at 14:09

## 2025-04-30 RX ADMIN — SODIUM CHLORIDE, SODIUM LACTATE, POTASSIUM CHLORIDE, AND CALCIUM CHLORIDE: .6; .31; .03; .02 INJECTION, SOLUTION INTRAVENOUS at 19:56

## 2025-04-30 RX ADMIN — SODIUM CHLORIDE, SODIUM LACTATE, POTASSIUM CHLORIDE, AND CALCIUM CHLORIDE: .6; .31; .03; .02 INJECTION, SOLUTION INTRAVENOUS at 09:35

## 2025-04-30 RX ADMIN — ACETAMINOPHEN 1000 MG: 500 TABLET, FILM COATED ORAL at 03:11

## 2025-04-30 RX ADMIN — SODIUM CHLORIDE, SODIUM LACTATE, POTASSIUM CHLORIDE, AND CALCIUM CHLORIDE 1000 ML: .6; .31; .03; .02 INJECTION, SOLUTION INTRAVENOUS at 03:41

## 2025-04-30 RX ADMIN — CEFTRIAXONE SODIUM 2000 MG: 2 INJECTION, POWDER, FOR SOLUTION INTRAMUSCULAR; INTRAVENOUS at 23:38

## 2025-04-30 RX ADMIN — SODIUM CHLORIDE, PRESERVATIVE FREE 10 ML: 5 INJECTION INTRAVENOUS at 20:32

## 2025-04-30 RX ADMIN — OXYCODONE 5 MG: 5 TABLET ORAL at 20:32

## 2025-04-30 RX ADMIN — WATER 1000 MG: 1 INJECTION INTRAMUSCULAR; INTRAVENOUS; SUBCUTANEOUS at 09:33

## 2025-04-30 RX ADMIN — Medication 2500 MG: at 04:38

## 2025-04-30 RX ADMIN — AZITHROMYCIN MONOHYDRATE 500 MG: 500 INJECTION, POWDER, LYOPHILIZED, FOR SOLUTION INTRAVENOUS at 09:37

## 2025-04-30 RX ADMIN — PIPERACILLIN AND TAZOBACTAM 4500 MG: 4; .5 INJECTION, POWDER, FOR SOLUTION INTRAVENOUS at 03:16

## 2025-04-30 RX ADMIN — ALBUTEROL SULFATE 2.5 MG: 2.5 SOLUTION RESPIRATORY (INHALATION) at 03:18

## 2025-04-30 RX ADMIN — PREGABALIN 100 MG: 100 CAPSULE ORAL at 11:18

## 2025-04-30 RX ADMIN — SODIUM CHLORIDE, PRESERVATIVE FREE 10 ML: 5 INJECTION INTRAVENOUS at 09:32

## 2025-04-30 RX ADMIN — SODIUM CHLORIDE, SODIUM LACTATE, POTASSIUM CHLORIDE, AND CALCIUM CHLORIDE 1000 ML: .6; .31; .03; .02 INJECTION, SOLUTION INTRAVENOUS at 03:12

## 2025-04-30 ASSESSMENT — PAIN DESCRIPTION - LOCATION
LOCATION: FOOT;BACK
LOCATION: BACK;LEG

## 2025-04-30 ASSESSMENT — LIFESTYLE VARIABLES
HOW OFTEN DO YOU HAVE A DRINK CONTAINING ALCOHOL: NEVER
HOW MANY STANDARD DRINKS CONTAINING ALCOHOL DO YOU HAVE ON A TYPICAL DAY: PATIENT DOES NOT DRINK

## 2025-04-30 ASSESSMENT — PAIN DESCRIPTION - DESCRIPTORS: DESCRIPTORS: ACHING;DISCOMFORT

## 2025-04-30 ASSESSMENT — PAIN SCALES - GENERAL
PAINLEVEL_OUTOF10: 8
PAINLEVEL_OUTOF10: 0
PAINLEVEL_OUTOF10: 8

## 2025-04-30 NOTE — ED NOTES
TRANSFER - OUT REPORT:    Verbal report given to Perlita STINSON on Duy Ray  being transferred to 2 for routine progression of patient care       Report consisted of patient's Situation, Background, Assessment and   Recommendations(SBAR).     Information from the following report(s) ED Encounter Summary was reviewed with the receiving nurse.    [place] Fall Assessment:    Presents to emergency department  because of falls (Syncope, seizure, or loss of consciousness): No  Age > 70: No  Altered Mental Status, Intoxication with alcohol or substance confusion (Disorientation, impaired judgment, poor safety awaremess, or inability to follow instructions): Yes  Impaired Mobility: Ambulates or transfers with assistive devices or assistance; Unable to ambulate or transer.: No  Nursing Judgement: No          Lines:   Peripheral IV 04/30/25 Right Forearm (Active)       Peripheral IV 04/30/25 Right Hand (Active)        Opportunity for questions and clarification was provided.      Patient transported with:  Bowen Singh RN  04/30/25 5064

## 2025-04-30 NOTE — ED TRIAGE NOTES
Pt BIB EMS from home. Per wife pt woke from sleep altered. Diaphoretic. Febrile. Pt states he was fine when he went to sleep around 2000. Pt received 500mL NS with EMS

## 2025-04-30 NOTE — H&P
Hospitalist History and Physical   Admit Date:  2025  2:18 AM   Name:  Duy Ray   Age:  54 y.o.  Sex:  male  :  1970   MRN:  [id number]   Room:  [facility]/    Presenting/Chief Complaint: Altered Mental Status and Fever     Reason(s) for Admission: Sepsis (HCC) [A41.9]     History of Present Illness:     Duy Ray is a 54 y.o. male with medical history of :    -neuropathy  -tobacco use  -BMI 40     Evaluated with malaise for several days  Unsure if fever  No cough or shortness of breath  No dysuria or hematuria   No nephrolithiasis     Met sepsis criteria due to .1, RR 33, , WBC 12.7K, BP 98/59  Desaturated 89% RA    Denies lung disease or PIOTR    CXR pulmonary vascular congestion and left base opacity  UA with trace  leukocyte esterase and hematuria   Blood cultures pending   Lactate 2.0  RVP negative   S/p 2 L fluid bolus, vancomycin and zosyn in ED         FULL CODE  Wife Ana     Assessment & Plan:     Principal Problem:    Sepsis (HCC)  Plan:   UTI (urinary tract infection)  Plan:     Bacterial pneumonia  Plan:     Admit remote tele   D1 rocephin and azithromycin  Followup blood and urine cultures   LR 100cc/hr for 24 hours  Trend lactate   O2 as needed       Active Problems:    Hypertension  Plan:   Denies taking meds           Neuralgia and neuritis, unspecified  Plan:   Resume lyrica      Hematuria  Plan:   Followup urology         Thrombocytopenia  Plan:   Trend CBC        Hyponatremia  Plan:     Repeat tomorrow        Elevated AST (SGOT)  Plan:   Repeat tomorrow           BMI 40:  Needs modification        Tobacco use:  Needs cessation   add nicotine patch      Hyperglycemia:  Trend glucoses       PT/OT evals ordered?  Not ordered; patient not expected to need rehab  Diet: No diet orders on file  VTE prophylaxis: Lovenox  Code status: FULL   Code status discussed: Yes  Blood consent obtained: No      Non-peripheral Lines and Tubes (if present):

## 2025-04-30 NOTE — CARE COORDINATION
MSN, CM:  attempted to speak with patient with no success.  Patient with AMS at this time.  Contacted patient's wife and all below information was gathered by her.  Patient lives in a 1st floor apartment with his wife with 1 step for entrance.  Patient is independent with most ADL's and uses a rollator for ambulation.  Patient does not have any HH, rehab, palliative care, or home oxygen currently.  Patient has applied for disability and is awaiting answer.  Patient is able to drive himself to all appointments.  Patient was admitted for AMS, fever of 101.1 and heart rate of 124.  Urine and blood culture completed and pending.  IV fluids and abx started.  CXR revealed pneumonia.  PT/OT consulted for evaluation and recommendations.  Case Management will continue to follow for any discharge needs.       04/30/25 5840   Service Assessment   Patient Orientation Alert and Oriented;Self   Cognition Alert   History Provided By Significant Other   Primary Caregiver Self   Support Systems Spouse/Significant Other   Patient's Healthcare Decision Maker is: Legal Next of Kin   PCP Verified by CM No  ([facility] referral)   Prior Functional Level Independent in ADLs/IADLs   Current Functional Level Other (see comment)  (PT/OT consulted)   Can patient return to prior living arrangement Unknown at present   Ability to make needs known: Good   Family able to assist with home care needs: Yes   Would you like for me to discuss the discharge plan with any other family members/significant others, and if so, who? Yes  (Wife)   Financial Resources Medicaid   Community Resources None   Social/Functional History   Lives With Spouse   Type of Home Apartment   Home Layout One level   Home Access Stairs to enter without rails   Entrance Stairs - Number of Steps 1   Bathroom Shower/Tub Tub/Shower unit   Bathroom Toilet Standard   Bathroom Equipment Shower chair   Bathroom Accessibility Accessible   Home Equipment Rollator   Receives Help From Family    Prior Level of Assist for ADLs Independent   Prior Level of Assist for Homemaking Needs assistance   Homemaking Responsibilities Yes   Ambulation Assistance Independent   Prior Level of Assist for Transfers Independent   Active  Yes   Mode of Transportation Car   Occupation Unemployed   Type of Occupation disability applied for   Discharge Planning   Type of Residence Apartment   Living Arrangements Spouse/Significant Other   Current Services Prior To Admission None   DME Ordered? No   Potential Assistance Purchasing Medications No   Type of Home Care Services None   Patient expects to be discharged to: Apartment   One/Two Story Residence One story   History of falls? 1

## 2025-04-30 NOTE — ED PROVIDER NOTES
Emergency Department Provider Note       [facility] EMERGENCY DEPT   PCP: None, None   Age: 54 y.o.   Sex: male     DISPOSITION Decision To Admit 04/30/2025 04:25:05 AM    ICD-10-CM    1. Sepsis, due to unspecified organism, unspecified whether acute organ dysfunction present (HCC)  A41.9       2. Pneumonia of left lower lobe due to infectious organism  J18.9           Medical Decision Making     Blood pressure improved with fluids.  Mentation improving.  Given broad-spectrum antibiotics.  With lower lobe infiltrate on chest x-ray.  Respiratory panel negative.  Urine still pending.  Discussed with hospitalist for admission.     1 or more acute illnesses that pose a threat to life or bodily function.   Shared medical decision making was utilized in creating the patients health plan today.  I independently ordered and reviewed each unique test.    I reviewed external records: ED visit note from a different ED.   I reviewed external records: provider visit note from PCP.  I reviewed external records: provider visit note from outside specialist.  I reviewed external records: previous EKG including cardiologist interpretation.    I reviewed external records: previous lab results from outside ED.  I reviewed external records: previous imaging study including radiologist interpretation.   The patients assessment required an independent historian: EMS.  The reason they were needed is important historical information not provided by the patient.  ED cardiac monitoring rhythm strip was ordered and interpreted:  sinus rhythm, no evidence of an arrhythmia  ST Segments:Nonspecific ST segments - NO STEMI   Rate: 122  I interpreted the X-rays left lower lobe infiltrate.    The patient was admitted and I have discussed patient management with the admitting provider.  SEP-1 CORE MEASURE    Fluid Resuscitation Rational: at least 30mL/kg based on ideal body weight due to obesity defined as BMI >30 (patient's BMI is Body mass index is 40.6  Hemoglobin 15.1 13.6 - 17.2 g/dL    Hematocrit 44.4 41.1 - 50.3 %    MCV 73.4 (L) 82 - 102 FL    MCH 25.0 (L) 26.1 - 32.9 PG    MCHC 34.0 31.4 - 35.0 g/dL    RDW 15.2 (H) 11.9 - 14.6 %    Platelets 113 (L) 150 - 450 K/uL    MPV 11.7 9.4 - 12.3 FL    nRBC 0.00 0.0 - 0.2 K/uL    Differential Type AUTOMATED      Neutrophils % 95.5 (H) 43.0 - 78.0 %    Lymphocytes % 2.0 (L) 13.0 - 44.0 %    Monocytes % 1.3 (L) 4.0 - 12.0 %    Eosinophils % 0.0 (L) 0.5 - 7.8 %    Basophils % 0.4 0.0 - 2.0 %    Immature Granulocytes % 0.8 0.0 - 5.0 %    Neutrophils Absolute 12.11 (H) 1.70 - 8.20 K/UL    Lymphocytes Absolute 0.25 (L) 0.50 - 4.60 K/UL    Monocytes Absolute 0.17 0.10 - 1.30 K/UL    Eosinophils Absolute 0.00 0.00 - 0.80 K/UL    Basophils Absolute 0.05 0.00 - 0.20 K/UL    Immature Granulocytes Absolute 0.10 0.0 - 0.5 K/UL   Lactate, Sepsis   Result Value Ref Range    Lactic Acid, Sepsis 2.0 0.5 - 2.0 MMOL/L   Procalcitonin   Result Value Ref Range    Procalcitonin 14.40 (H) 0.00 - 0.10 ng/mL         XR CHEST PORTABLE   Final Result   Pulmonary vascular congestion. Left basilar opacity may represent   pneumonia or atelectasis.         Electronically signed by Hema Salinas                   Recent Labs     04/30/25  0300   COVID19 NOT DETECTED        Voice dictation software was used during the making of this note.  This software is not perfect and grammatical and other typographical errors may be present.  This note has not been completely proofread for errors.     Savanah Ashley MD  04/30/25 8417

## 2025-04-30 NOTE — PROGRESS NOTES
TRANSFER - IN REPORT:    Verbal report received from SHANTELL Wiseman on Duy Ray  being received from ED for routine progression of patient care      Report consisted of patient's Situation, Background, Assessment and   Recommendations(SBAR).     Information from the following report(s) ED Encounter Summary and ED SBAR was reviewed with the receiving nurse.    Opportunity for questions and clarification was provided.     Report given to SHANTELL Curtis who will assume care of patient upon arrival to unit.

## 2025-04-30 NOTE — PROGRESS NOTES
Pt arrived to room 802 via stretcher. Pt drowsy, oriented to person and place. Pt on RA, complaints of feet and back pain 8/10.  Tremors noted. Pt has no skin breakdown noted. Pt skin assessed with Holli RN. Pt oriented to room and surroundings. Pt encouraged to call for assistance if needed call light in reach, will monitor.

## 2025-04-30 NOTE — PROGRESS NOTES
Bed alarm alarming.  Pt became aggressive when asked to wait for staff to assist him to the RR. Pt got out of bed, pulled iv to the point of disconnecting chamber. Pt states \"Im 54 yrs old, I can go by myself\" pt continues to ambulate to RR. Pt assisted to RR and then back to bed.  Pt sitting up on side of bed, bed alarm in place. Door open

## 2025-04-30 NOTE — PROGRESS NOTES
Resting in bed, more oriented and cooperative this afternoon. No distress. Pt RA, call light in reach, will monitor.

## 2025-05-01 ENCOUNTER — APPOINTMENT (OUTPATIENT)
Dept: GENERAL RADIOLOGY | Age: 55
End: 2025-05-01
Payer: MEDICAID

## 2025-05-01 ENCOUNTER — APPOINTMENT (OUTPATIENT)
Dept: CT IMAGING | Age: 55
End: 2025-05-01
Payer: MEDICAID

## 2025-05-01 LAB
ALBUMIN SERPL-MCNC: 2.6 G/DL (ref 3.5–5)
ALBUMIN/GLOB SERPL: 0.7 (ref 1–1.9)
ALP SERPL-CCNC: 61 U/L (ref 40–129)
ALT SERPL-CCNC: 35 U/L (ref 8–55)
AST SERPL-CCNC: 72 U/L (ref 15–37)
BASOPHILS # BLD: 0.03 K/UL (ref 0–0.2)
BASOPHILS NFR BLD: 0.3 % (ref 0–2)
BILIRUB DIRECT SERPL-MCNC: 0.3 MG/DL (ref 0–0.3)
BILIRUB SERPL-MCNC: 0.6 MG/DL (ref 0–1.2)
DIFFERENTIAL METHOD BLD: ABNORMAL
EOSINOPHIL # BLD: 0.19 K/UL (ref 0–0.8)
EOSINOPHIL NFR BLD: 1.7 % (ref 0.5–7.8)
ERYTHROCYTE [DISTWIDTH] IN BLOOD BY AUTOMATED COUNT: 15.1 % (ref 11.9–14.6)
GLOBULIN SER CALC-MCNC: 4 G/DL (ref 2.3–3.5)
HAV IGM SER QL: NONREACTIVE
HBV CORE IGM SER QL: NONREACTIVE
HBV SURFACE AG SER QL: NONREACTIVE
HCT VFR BLD AUTO: 41.3 % (ref 41.1–50.3)
HCV AB SER QL: NONREACTIVE
HGB BLD-MCNC: 13.9 G/DL (ref 13.6–17.2)
HIV 1+2 AB+HIV1 P24 AG SERPL QL IA: NONREACTIVE
HIV 1/2 RESULT COMMENT: NORMAL
IMM GRANULOCYTES # BLD AUTO: 0.04 K/UL (ref 0–0.5)
IMM GRANULOCYTES NFR BLD AUTO: 0.4 % (ref 0–5)
LYMPHOCYTES # BLD: 1.09 K/UL (ref 0.5–4.6)
LYMPHOCYTES NFR BLD: 9.8 % (ref 13–44)
MCH RBC QN AUTO: 25.3 PG (ref 26.1–32.9)
MCHC RBC AUTO-ENTMCNC: 33.7 G/DL (ref 31.4–35)
MCV RBC AUTO: 75.2 FL (ref 82–102)
MONOCYTES # BLD: 0.43 K/UL (ref 0.1–1.3)
MONOCYTES NFR BLD: 3.9 % (ref 4–12)
NEUTS SEG # BLD: 9.29 K/UL (ref 1.7–8.2)
NEUTS SEG NFR BLD: 83.9 % (ref 43–78)
NRBC # BLD: 0 K/UL (ref 0–0.2)
PLATELET # BLD AUTO: 89 K/UL (ref 150–450)
PMV BLD AUTO: 11.3 FL (ref 9.4–12.3)
PROT SERPL-MCNC: 6.7 G/DL (ref 6.3–8.2)
RBC # BLD AUTO: 5.49 M/UL (ref 4.23–5.6)
WBC # BLD AUTO: 11.1 K/UL (ref 4.3–11.1)

## 2025-05-01 PROCEDURE — 2500000003 HC RX 250 WO HCPCS: Performed by: INTERNAL MEDICINE

## 2025-05-01 PROCEDURE — 36415 COLL VENOUS BLD VENIPUNCTURE: CPT

## 2025-05-01 PROCEDURE — 87389 HIV-1 AG W/HIV-1&-2 AB AG IA: CPT

## 2025-05-01 PROCEDURE — 6370000000 HC RX 637 (ALT 250 FOR IP): Performed by: INTERNAL MEDICINE

## 2025-05-01 PROCEDURE — 80076 HEPATIC FUNCTION PANEL: CPT

## 2025-05-01 PROCEDURE — 80074 ACUTE HEPATITIS PANEL: CPT

## 2025-05-01 PROCEDURE — 97165 OT EVAL LOW COMPLEX 30 MIN: CPT

## 2025-05-01 PROCEDURE — 97112 NEUROMUSCULAR REEDUCATION: CPT

## 2025-05-01 PROCEDURE — 70450 CT HEAD/BRAIN W/O DYE: CPT

## 2025-05-01 PROCEDURE — 1100000003 HC PRIVATE W/ TELEMETRY

## 2025-05-01 PROCEDURE — 97530 THERAPEUTIC ACTIVITIES: CPT

## 2025-05-01 PROCEDURE — 2580000003 HC RX 258: Performed by: INTERNAL MEDICINE

## 2025-05-01 PROCEDURE — 85025 COMPLETE CBC W/AUTO DIFF WBC: CPT

## 2025-05-01 PROCEDURE — 6370000000 HC RX 637 (ALT 250 FOR IP): Performed by: NURSE PRACTITIONER

## 2025-05-01 PROCEDURE — 73620 X-RAY EXAM OF FOOT: CPT

## 2025-05-01 PROCEDURE — 6360000002 HC RX W HCPCS: Performed by: INTERNAL MEDICINE

## 2025-05-01 PROCEDURE — 97161 PT EVAL LOW COMPLEX 20 MIN: CPT

## 2025-05-01 RX ORDER — ALBUTEROL SULFATE 90 UG/1
2 INHALANT RESPIRATORY (INHALATION) EVERY 6 HOURS PRN
COMMUNITY

## 2025-05-01 RX ORDER — ROSUVASTATIN CALCIUM 20 MG/1
20 TABLET, COATED ORAL
COMMUNITY

## 2025-05-01 RX ORDER — PREGABALIN 100 MG/1
200 CAPSULE ORAL 3 TIMES DAILY
COMMUNITY

## 2025-05-01 RX ORDER — CLOPIDOGREL BISULFATE 75 MG/1
75 TABLET ORAL
Status: ON HOLD | COMMUNITY
End: 2025-05-03 | Stop reason: HOSPADM

## 2025-05-01 RX ORDER — CYCLOBENZAPRINE HCL 10 MG
10 TABLET ORAL 3 TIMES DAILY PRN
COMMUNITY

## 2025-05-01 RX ORDER — ASPIRIN 81 MG/1
81 TABLET, CHEWABLE ORAL DAILY
COMMUNITY

## 2025-05-01 RX ORDER — AMITRIPTYLINE HYDROCHLORIDE 50 MG/1
50 TABLET ORAL NIGHTLY
COMMUNITY

## 2025-05-01 RX ORDER — ASPIRIN 81 MG/1
81 TABLET, CHEWABLE ORAL DAILY
Status: DISCONTINUED | OUTPATIENT
Start: 2025-05-01 | End: 2025-05-03 | Stop reason: HOSPADM

## 2025-05-01 RX ADMIN — SODIUM CHLORIDE, PRESERVATIVE FREE 10 ML: 5 INJECTION INTRAVENOUS at 08:20

## 2025-05-01 RX ADMIN — CEFTRIAXONE SODIUM 2000 MG: 2 INJECTION, POWDER, FOR SOLUTION INTRAMUSCULAR; INTRAVENOUS at 23:28

## 2025-05-01 RX ADMIN — ASPIRIN 81 MG: 81 TABLET, CHEWABLE ORAL at 09:20

## 2025-05-01 RX ADMIN — PREGABALIN 100 MG: 100 CAPSULE ORAL at 20:30

## 2025-05-01 RX ADMIN — PREGABALIN 100 MG: 100 CAPSULE ORAL at 13:29

## 2025-05-01 RX ADMIN — AZITHROMYCIN MONOHYDRATE 500 MG: 500 INJECTION, POWDER, LYOPHILIZED, FOR SOLUTION INTRAVENOUS at 08:18

## 2025-05-01 RX ADMIN — SODIUM CHLORIDE, SODIUM LACTATE, POTASSIUM CHLORIDE, AND CALCIUM CHLORIDE: .6; .31; .03; .02 INJECTION, SOLUTION INTRAVENOUS at 08:18

## 2025-05-01 RX ADMIN — ACETAMINOPHEN 650 MG: 325 TABLET ORAL at 08:14

## 2025-05-01 RX ADMIN — ENOXAPARIN SODIUM 30 MG: 100 INJECTION SUBCUTANEOUS at 08:14

## 2025-05-01 RX ADMIN — PREGABALIN 100 MG: 100 CAPSULE ORAL at 08:14

## 2025-05-01 RX ADMIN — SODIUM CHLORIDE, PRESERVATIVE FREE 10 ML: 5 INJECTION INTRAVENOUS at 20:30

## 2025-05-01 ASSESSMENT — PAIN SCALES - GENERAL
PAINLEVEL_OUTOF10: 8
PAINLEVEL_OUTOF10: 8

## 2025-05-01 ASSESSMENT — PAIN DESCRIPTION - LOCATION
LOCATION: HIP
LOCATION: HIP

## 2025-05-01 ASSESSMENT — PAIN DESCRIPTION - ORIENTATION
ORIENTATION: RIGHT;LEFT
ORIENTATION: RIGHT;LEFT

## 2025-05-01 NOTE — PROGRESS NOTES
ACUTE PHYSICAL THERAPY GOALS:   (Developed with and agreed upon by patient and/or caregiver.)  LTG:  (1.)Mr. Ray will move from supine to sit and sit to supine , scoot up and down, and roll side to side in bed with INDEPENDENCE within 7 treatment day(s).    (2.)Mr. Ray will transfer from bed to chair and chair to bed with MODIFIED INDEPENDENCE using the least restrictive device within 7 treatment day(s).    (3.)Mr. Ray will ambulate with SUPERVISION for 100 feet with the least restrictive device within 7 treatment day(s).  (4.)Mr. Ray will participate in therapeutic activity/exercises x 25 minutes for increased strength within 7 treatment days.  (5.)Mr. Ray will perform standing static and dynamic balance activities x 15 minutes with SUPERVISION to improve safety within 7 day(s).    ________________________________________________________________________________________________        PHYSICAL THERAPY Initial Assessment and AM  (Link to Caseload Tracking: PT Visit Days : 1  Acknowledge Orders  Time In/Out  PT Charge Capture  Rehab Caseload Tracker    Duy Ray is a 54 y.o. male   PRIMARY DIAGNOSIS: Sepsis ([facility])  Sepsis ([facility]) [A41.9]  Pneumonia of left lower lobe due to infectious organism [J18.9]  Sepsis, due to unspecified organism, unspecified whether acute organ dysfunction present ([facility]) [A41.9]       Reason for Referral: Generalized Muscle Weakness (M62.81)  Difficulty in walking, Not elsewhere classified (R26.2)  History of falling (Z91.81)  Inpatient: Payor: [place] MEDICA[place] / Plan: [place] / Product Type: *No Product type* /     ASSESSMENT:     REHAB RECOMMENDATIONS:   Recommendation to date pending progress:  Setting:  Short-term Rehab    Equipment:    To Be Determined     ASSESSMENT:  Mr. Ray arrived to the hospital with c/o fever and AMS.  Pt has a history of: CVA and neuropathy.  Pt admitted for sepsis workup with  Transfers: Independent  Active : Yes  Mode of Transportation: Car  Occupation: Unemployed  Type of Occupation: disability applied for  Additional Comments: Lives with spouse in one story home and uses RW/rollator for HH ambulation; recent falls    OBJECTIVE:     PAIN: VITALS / O2: PRECAUTION / LINES / DRAINS:   Pre Treatment: 8/10         Post Treatment: 8/10 Vitals        Oxygen      None    RESTRICTIONS/PRECAUTIONS:  Restrictions/Precautions: Fall Risk                 GROSS EVALUATION: LE Intact Impaired (Comments):   AROM []  L hip flexion 2+/5   PROM []    Strength []  L hip flexion 2+/5; Generally decreased strength observed grossly   Balance []  Good-fair sitting and fair+ standing   Posture [] N/A   Sensation []     Coordination []      Tone []     Edema []    Activity Tolerance []  Decreased     []      COGNITION/  PERCEPTION: Intact Impaired (Comments):   Orientation [x]     Vision [x]  Glasses   Hearing [x]     Cognition  [x]       MOBILITY: I Mod I S SBA CGA Min Mod Max Total  NT x2 Comments:   Bed Mobility    Rolling [] [] [] [] [] [] [] [] [] [] []    Supine to Sit [] [] [] [] [] [] [] [] [] [] []    Scooting [] [] [] [] [] [] [] [] [] [] []    Sit to Supine [] [] [] [] [] [] [] [] [] [] []    Transfers    Sit to Stand [] [] [] [] [x] [] [] [] [] [] []    Bed to Chair [] [] [] [] [x] [] [] [] [] [] [x]    Stand to Sit [] [] [] [] [x] [] [] [] [] [] []     [] [] [] [] [] [] [] [] [] [] []    I=Independent, Mod I=Modified Independent, S=Supervision, SBA=Standby Assistance, CGA=Contact Guard Assistance,   Min=Minimal Assistance, Mod=Moderate Assistance, Max=Maximal Assistance, Total=Total Assistance, NT=Not Tested    GAIT: I Mod I S SBA CGA Min Mod Max Total  NT x2 Comments:   Level of Assistance [] [] [] [] [x] [] [] [] [] [] []    Distance   80 feet    DME Gait Belt and Rolling Walker    Gait Quality Decreased ryan , Decreased step length, and Trunk flexion    Weightbearing Status

## 2025-05-01 NOTE — PROGRESS NOTES
Pt found with family members at bedside with open bottle of flexeril. When asked pt states \"yeah I took my own meds.\" Pt made aware this is  not allowed in the hospital. This nurse asked family to take meds back home. BRENDA Pittman made aware

## 2025-05-01 NOTE — PROGRESS NOTES
ACUTE OCCUPATIONAL THERAPY GOALS:   (Developed with and agreed upon by patient and/or caregiver.)  1. Patient will complete lower body bathing and dressing with MOD I and adaptive equipment as needed.   2.Patient will complete upper body bathing and dressing with MOD I and adaptive equipment as needed.  3. Patient will complete toileting with MOD I.   4. Patient will tolerate 30 minutes of OT treatment with 1-2 rest breaks to increase activity tolerance for ADLs.   5. Patient will complete functional transfers with MOD I and adaptive equipment as needed.   6. Patient will complete functional activity with MOD I and adaptive equipment as needed.  7. Patient will complete simple grooming task standing at the sink with MOD I.    Timeframe: 7 visits      OCCUPATIONAL THERAPY Initial Assessment and Daily Note       OT Visit Days: 1  Acknowledge Orders  Time  OT Charge Capture  Rehab Caseload Tracker      Duy Ray is a 54 y.o. male   PRIMARY DIAGNOSIS: Sepsis ([facility])  Sepsis ([facility]) [A41.9]  Pneumonia of left lower lobe due to infectious organism [J18.9]  Sepsis, due to unspecified organism, unspecified whether acute organ dysfunction present ([facility]) [A41.9]       Reason for Referral: Generalized Muscle Weakness (M62.81)  Other lack of cordination (R27.8)  Difficulty in walking, Not elsewhere classified (R26.2)  Inpatient: Pay[place]: [place] / Plan[place] / Product Type: *No Product type* /     ASSESSMENT:     REHAB RECOMMENDATIONS:   Recommendation to date pending progress:  Setting:  Short-term Rehab    Equipment:    To Be Determined     ASSESSMENT:  Mr. Ray presented to the hospital with sepsis. At baseline, pt is mod I for bADLs and mobility. Pt reported many falls. Stated his wife cannot care for him at home and he cannot care for himself.  Today, pt was received supine in bed. Completed bed mobility with CGA. MaxA for LB dressing. Performed functional transfers

## 2025-05-01 NOTE — PROGRESS NOTES
Resting in bed, alert oriented times 3. Pt complaints of hip pain 8/10 from laying in the bed. Pt on RA, no distress at this this time. Pt encouraged to call for assistance if needed call light in reach, will monitor

## 2025-05-01 NOTE — CONSULTS
Infectious Diseases Consult    Today's Date: 2025   Admit Date: 2025  : 1970    Impression/Plan   GN bacteremia - pending speciation  Fever - on admission  Malaise/fatigue  ? L lung opacity  - Source unclear. No localizing GI or  symptoms. Has a mild cough and vascular congestion on CXR with a pretty unimpressive L base opacity. Does have a R great toe callus that's large which he says is from a traumatic injury. He's been told he has diabetes and reports peripheral neuropathy. This toe doesn't have any signs of soft tissue infection.    Plan/Recs:  - F/u speciation of the gram negative. Source isn't clear, maybe the L lung opacity although this doesn't look very impressive and his only resp sx is a mild cough. He reports a dx of diabetes and significant peripheral neuropathy with a R great toe wound where there's now a large callus. Will get xray of the R foot although there's no evidence of soft tissue infection.  - Ceftriaxone is reasonable for now until speciation available. Can probably stop Azithro.  - Will follow.    Anti-infectives:   Ceftriaxone/Azithro  -    Subjective:   Date of Consultation:  May 1, 2025  Referring Physician: Radha Carias MD for: assistance in management of the above    Patient is a 54 y.o. male with hx obesity, smoking who presented with malaise for the last few days. Found to have gram negative bacteremia. Unclear source. ID consulted for management.    Seen at bedside this morning. Up to a chair watching TV. Says he's doing alright, but is still tired and feels weak. He doesn't remember coming to the hospital and says his wife made him come. Primarily remembers being so tired that he slept the whole day before admission. Doesn't clearly remember fever or chills. No GI or  symptoms. Only other complaints has been mild cough. ROS is otherwise unremarkable. Discussed his findings, course of illness, and the ID plan in detail.    Past Medical History:    BILIDIR  --  0.3   ALKPHOS 77 61   AST 68* 72*   ALT 39 35   GLOB 4.1* 4.0*      A1c No results found for: \"LABA1C\", \"EAG\"     Microbiology:     Results       Procedure Component Value Units Date/Time    Culture, Urine [[id number]] Collected: 04/30/25 0503    Order Status: Sent Specimen: Urine, clean catch Updated: 04/30/25 2229    Blood Culture 2 [[id number]]  (Abnormal) Collected: 04/30/25 0309    Order Status: Completed Specimen: Blood Updated: 05/01/25 0951     Special Requests --        RIGHT  FOREARM       Gram Stain Gram negative rods               AEROBIC AND ANAEROBIC BOTTLES                  CRITICAL RESULT NOT CALLED DUE TO PREVIOUS NOTIFICATION OF CRITICAL RESULT WITHIN THE LAST 24 HOURS.           Culture       Gram negative rods CULTURE IN PROGRESS,FURTHER UPDATES TO FOLLOW          Blood Culture 1 [[id number]]  (Abnormal) Collected: 04/30/25 0308    Order Status: Completed Specimen: Blood Updated: 05/01/25 0951     Special Requests --        LEFT  Antecubital       Gram Stain Gram negative rods               AEROBIC AND ANAEROBIC BOTTLES                  RESULTS VERIFIED, PHONED TO AND READ BACK BY PATY GARCIA AT 1828 ON 4/30/25, [facility]           Culture       Gram negative rods IDENTIFICATION AND SUSCEPTIBILITY TO FOLLOW                  Refer to Blood Culture ID Panel Accession [id number]          Culture, Blood, PCR ID Panel [[id number]]  (Abnormal) Collected: 04/30/25 0308    Order Status: Completed Specimen: Blood Updated: 04/30/25 1831     Accession Number [id number]        Enterococcus faecalis by PCR NOT DETECTED        Enterococcus faecium by PCR NOT DETECTED        Listeria monocytogenes by PCR NOT DETECTED        STAPHYLOCOCCUS NOT DETECTED        Staphylococcus Aureus NOT DETECTED        Staphylococcus epidermidis by PCR NOT DETECTED        Staphylococcus lugdunensis by PCR NOT DETECTED        STREPTOCOCCUS NOT DETECTED        Streptococcus agalactiae (Group B) NOT DETECTED        Strep

## 2025-05-01 NOTE — PROGRESS NOTES
Hospitalist Progress Note   Admit Date:  2025  2:18 AM   Name:  Duy Ray   Age:  54 y.o.  Sex:  male  :  1970   MRN:  [id number]   Room:  2/    Presenting/Chief Complaint: Altered Mental Status and Fever     Reason(s) for Admission: Sepsis (HCC) [A41.9]  Pneumonia of left lower lobe due to infectious organism [J18.9]  Sepsis, due to unspecified organism, unspecified whether acute organ dysfunction present (HCC) [A41.9]     Hospital Course:     Pt is a 53 yo male with pmh CVA (2024), tobacco use, neuropathy, obesity who presented to ED for progressive malaise x several days.  He was found to meet severe sepsis criteria with t max 101.1, RR 33, , WBC 12.6, SBP < 100.  CXR showed pulmonary vascular congestion and left base opacity.  UA also with possible infection.  He was started on Vanc/Zosyn in ED.      2/2 Blood cultures with GNR growth.     Subjective & 24hr Events:     Pt reports he had CVA while on vacation in [place] 2024 and has not had any follow up.  He voices concern that he has had futher CVAs due to headaches and worsening LUE weakness.     He reports feeling better than yesterday, overall.       Assessment & Plan:     Sepsis  CAP   Possible UTI  Bacteriemia   - Clinically improving today  - Cont Rocephin/Zithromax D 2   - Consult ID     Hx CVA  Pt concern for a repeated CVA since imaging in 2024  - Resume ASA 81 which he states he is compliant with   - Needs to establish with PCP   - Consult PT/OT  - CT head although we do not have his previous for comparison     Chronic pain   - okay to cont home regimen Flexeril and Lyrica       Anticipated Discharge Arrangements:   Home     PT/OT evals ordered?  Therapy evals ordered  Diet:  ADULT DIET; Regular  VTE prophylaxis: Lovenox  Code status: Full Code      Non-peripheral Lines and Tubes (if present):          Telemetry (if present):  Cardiac/Telemetry Monitor On: Bedside monitor in use        Hospital  Lead    Collection Time: 04/30/25  2:22 AM   Result Value Ref Range    Ventricular Rate 122 BPM    Atrial Rate 123 BPM    P-R Interval 148 ms    QRS Duration 86 ms    Q-T Interval 323 ms    QTc Calculation (Bazett) 461 ms    P Axis 62 degrees    R Axis -11 degrees    T Axis 91 degrees    Diagnosis       Sinus tachycardia  Abnormal R-wave progression, late transition  Borderline T wave abnormalities      Confirmed by Chaka Ghosh MD ([id number]) on 4/30/2025 5:58:41 AM     Comprehensive Metabolic Panel    Collection Time: 04/30/25  2:24 AM   Result Value Ref Range    Sodium 135 (L) 136 - 145 mmol/L    Potassium 3.5 3.5 - 5.1 mmol/L    Chloride 101 98 - 107 mmol/L    CO2 20 20 - 29 mmol/L    Anion Gap 14 7 - 16 mmol/L    Glucose 140 (H) 70 - 99 mg/dL    BUN 18 6 - 23 MG/DL    Creatinine 0.99 0.80 - 1.30 MG/DL    Est, Glom Filt Rate >90 >60 ml/min/1.73m2    Calcium 8.6 (L) 8.8 - 10.2 MG/DL    Total Bilirubin 0.8 0.0 - 1.2 MG/DL    ALT 39 8 - 55 U/L    AST 68 (H) 15 - 37 U/L    Alk Phosphatase 77 40 - 129 U/L    Total Protein 7.0 6.3 - 8.2 g/dL    Albumin 2.9 (L) 3.5 - 5.0 g/dL    Globulin 4.1 (H) 2.3 - 3.5 g/dL    Albumin/Globulin Ratio 0.7 (L) 1.0 - 1.9     CBC with Auto Differential    Collection Time: 04/30/25  2:24 AM   Result Value Ref Range    WBC 12.7 (H) 4.3 - 11.1 K/uL    RBC 6.05 (H) 4.23 - 5.6 M/uL    Hemoglobin 15.1 13.6 - 17.2 g/dL    Hematocrit 44.4 41.1 - 50.3 %    MCV 73.4 (L) 82 - 102 FL    MCH 25.0 (L) 26.1 - 32.9 PG    MCHC 34.0 31.4 - 35.0 g/dL    RDW 15.2 (H) 11.9 - 14.6 %    Platelets 113 (L) 150 - 450 K/uL    MPV 11.7 9.4 - 12.3 FL    nRBC 0.00 0.0 - 0.2 K/uL    Differential Type AUTOMATED      Neutrophils % 95.5 (H) 43.0 - 78.0 %    Lymphocytes % 2.0 (L) 13.0 - 44.0 %    Monocytes % 1.3 (L) 4.0 - 12.0 %    Eosinophils % 0.0 (L) 0.5 - 7.8 %    Basophils % 0.4 0.0 - 2.0 %    Immature Granulocytes % 0.8 0.0 - 5.0 %    Neutrophils Absolute 12.11 (H) 1.70 - 8.20 K/UL    Lymphocytes Absolute 0.25 (L)

## 2025-05-02 LAB
BACTERIA SPEC CULT: ABNORMAL
BACTERIA SPEC CULT: NORMAL
EST. AVERAGE GLUCOSE BLD GHB EST-MCNC: 170 MG/DL
GRAM STN SPEC: ABNORMAL
HBA1C MFR BLD: 7.6 % (ref 0–5.6)
SERVICE CMNT-IMP: ABNORMAL
SERVICE CMNT-IMP: ABNORMAL
SERVICE CMNT-IMP: NORMAL

## 2025-05-02 PROCEDURE — 1100000000 HC RM PRIVATE

## 2025-05-02 PROCEDURE — 2580000003 HC RX 258: Performed by: INTERNAL MEDICINE

## 2025-05-02 PROCEDURE — 6360000002 HC RX W HCPCS: Performed by: INTERNAL MEDICINE

## 2025-05-02 PROCEDURE — 6370000000 HC RX 637 (ALT 250 FOR IP): Performed by: NURSE PRACTITIONER

## 2025-05-02 PROCEDURE — 36415 COLL VENOUS BLD VENIPUNCTURE: CPT

## 2025-05-02 PROCEDURE — 83036 HEMOGLOBIN GLYCOSYLATED A1C: CPT

## 2025-05-02 PROCEDURE — 2500000003 HC RX 250 WO HCPCS: Performed by: INTERNAL MEDICINE

## 2025-05-02 PROCEDURE — 6370000000 HC RX 637 (ALT 250 FOR IP): Performed by: INTERNAL MEDICINE

## 2025-05-02 RX ORDER — CYCLOBENZAPRINE HCL 10 MG
10 TABLET ORAL 3 TIMES DAILY PRN
Status: DISCONTINUED | OUTPATIENT
Start: 2025-05-02 | End: 2025-05-03 | Stop reason: HOSPADM

## 2025-05-02 RX ADMIN — AZITHROMYCIN MONOHYDRATE 500 MG: 500 INJECTION, POWDER, LYOPHILIZED, FOR SOLUTION INTRAVENOUS at 08:33

## 2025-05-02 RX ADMIN — ACETAMINOPHEN 650 MG: 325 TABLET ORAL at 08:33

## 2025-05-02 RX ADMIN — CYCLOBENZAPRINE 10 MG: 10 TABLET, FILM COATED ORAL at 08:54

## 2025-05-02 RX ADMIN — CYCLOBENZAPRINE 10 MG: 10 TABLET, FILM COATED ORAL at 20:46

## 2025-05-02 RX ADMIN — SODIUM CHLORIDE, PRESERVATIVE FREE 10 ML: 5 INJECTION INTRAVENOUS at 08:34

## 2025-05-02 RX ADMIN — PREGABALIN 100 MG: 100 CAPSULE ORAL at 08:34

## 2025-05-02 RX ADMIN — SODIUM CHLORIDE, PRESERVATIVE FREE 10 ML: 5 INJECTION INTRAVENOUS at 20:42

## 2025-05-02 RX ADMIN — ASPIRIN 81 MG: 81 TABLET, CHEWABLE ORAL at 08:33

## 2025-05-02 RX ADMIN — PREGABALIN 100 MG: 100 CAPSULE ORAL at 13:49

## 2025-05-02 RX ADMIN — ENOXAPARIN SODIUM 30 MG: 100 INJECTION SUBCUTANEOUS at 08:36

## 2025-05-02 RX ADMIN — PREGABALIN 100 MG: 100 CAPSULE ORAL at 20:41

## 2025-05-02 ASSESSMENT — PAIN DESCRIPTION - LOCATION
LOCATION: GENERALIZED
LOCATION: HIP;SHOULDER

## 2025-05-02 ASSESSMENT — PAIN SCALES - GENERAL
PAINLEVEL_OUTOF10: 7
PAINLEVEL_OUTOF10: 0
PAINLEVEL_OUTOF10: 8
PAINLEVEL_OUTOF10: 8

## 2025-05-02 NOTE — CARE COORDINATION
MSN, CM:  patient continues on IV abx r/t bacteremia.  Patient has been accepted to [facility] awaiting auth.  LOC completed today.  Case Management will continue to follow.

## 2025-05-02 NOTE — PROGRESS NOTES
Hospitalist Progress Note   Admit Date:  2025  2:18 AM   Name:  Duy Ray   Age:  54 y.o.  Sex:  male  :  1970   MRN:  [id number]   Room:  2/    Presenting/Chief Complaint: Altered Mental Status and Fever     Reason(s) for Admission: Sepsis (HCC) [A41.9]  Pneumonia of left lower lobe due to infectious organism [J18.9]  Sepsis, due to unspecified organism, unspecified whether acute organ dysfunction present (HCC) [A41.9]     Hospital Course:     Pt is a 53 yo male with pmh CVA (2024), tobacco use, neuropathy, obesity who presented to ED for progressive malaise x several days.  He was found to meet severe sepsis criteria with t max 101.1, RR 33, , WBC 12.6, SBP < 100.  CXR showed pulmonary vascular congestion and left base opacity.  UA also with possible infection.  He was started on Vanc/Zosyn in ED.       2/2 Blood cultures with GNR growth.       Subjective & 24hr Events:     Pt up in chair this morning.  C/O chronic back pain, worse due to hospital bed.        Assessment & Plan:     Sepsis  Possible CAP   Possible UTI  Bacteriemia   - Cont Rocephin/Zithromax D 3, stop Zithromax   - ID following   - Unclear source as CXR and UA not overwhelming and pt was not symptomatic of either potential infections      Hx CVA  Pt concern for a repeated CVA since imaging in 2024  - Resume ASA 81 which he states he is compliant with   - Needs to establish with PCP   - PT/OT  - CT head does reveal old CVAs but difficult to determine since we do not have his previous imaging for comparison      Chronic pain   - okay to cont home regimen Flexeril and Lyrica         Anticipated Discharge Arrangements:   Home      PT/OT evals ordered?  Therapy evals ordered  Diet:  ADULT DIET; Regular  VTE prophylaxis: Lovenox  Code status: Full Code    Non-peripheral Lines and Tubes (if present):          Telemetry (if present):  Cardiac/Telemetry Monitor On: Bedside monitor in use        Hospital  (H) 0.5 - 2.0 MMOL/L   Lactic Acid    Collection Time: 04/30/25  1:59 PM   Result Value Ref Range    Lactic Acid 1.9 0.5 - 2.0 mmol/L   CBC with Auto Differential    Collection Time: 05/01/25  5:43 AM   Result Value Ref Range    WBC 11.1 4.3 - 11.1 K/uL    RBC 5.49 4.23 - 5.6 M/uL    Hemoglobin 13.9 13.6 - 17.2 g/dL    Hematocrit 41.3 41.1 - 50.3 %    MCV 75.2 (L) 82 - 102 FL    MCH 25.3 (L) 26.1 - 32.9 PG    MCHC 33.7 31.4 - 35.0 g/dL    RDW 15.1 (H) 11.9 - 14.6 %    Platelets 89 (L) 150 - 450 K/uL    MPV 11.3 9.4 - 12.3 FL    nRBC 0.00 0.0 - 0.2 K/uL    Differential Type AUTOMATED      Neutrophils % 83.9 (H) 43.0 - 78.0 %    Lymphocytes % 9.8 (L) 13.0 - 44.0 %    Monocytes % 3.9 (L) 4.0 - 12.0 %    Eosinophils % 1.7 0.5 - 7.8 %    Basophils % 0.3 0.0 - 2.0 %    Immature Granulocytes % 0.4 0.0 - 5.0 %    Neutrophils Absolute 9.29 (H) 1.70 - 8.20 K/UL    Lymphocytes Absolute 1.09 0.50 - 4.60 K/UL    Monocytes Absolute 0.43 0.10 - 1.30 K/UL    Eosinophils Absolute 0.19 0.00 - 0.80 K/UL    Basophils Absolute 0.03 0.00 - 0.20 K/UL    Immature Granulocytes Absolute 0.04 0.0 - 0.5 K/UL   Hepatic Function Panel    Collection Time: 05/01/25  5:43 AM   Result Value Ref Range    Total Protein 6.7 6.3 - 8.2 g/dL    Albumin 2.6 (L) 3.5 - 5.0 g/dL    Globulin 4.0 (H) 2.3 - 3.5 g/dL    Albumin/Globulin Ratio 0.7 (L) 1.0 - 1.9      Total Bilirubin 0.6 0.0 - 1.2 MG/DL    Bilirubin, Direct 0.3 0.0 - 0.3 MG/DL    Alk Phosphatase 61 40 - 129 U/L    AST 72 (H) 15 - 37 U/L    ALT 35 8 - 55 U/L   HIV 1/2 Ag/Ab, 4TH Generation,W Rflx Confirm    Collection Time: 05/01/25  1:54 PM   Result Value Ref Range    HIV 1/2 Interp NONREACTIVE NR      HIV 1/2 Result Comment        While this assay is highly sensitive, a non-reactive result for HIV antibodies HIV-1 and HIV-2 and p24 antigen does not preclude the possiblity of exposure to or infection with HIV.   Hepatitis Panel, Acute    Collection Time: 05/01/25  1:54 PM   Result Value Ref Range

## 2025-05-02 NOTE — PROGRESS NOTES
Resting in bed, alert oriented times 3. Pt complaints of hip and shoulder pain 8/10 from laying in the bed. Pt on RA, wheezes noted. no distress at this this time. Pt encouraged to call for assistance if needed call light in reach, will monitor

## 2025-05-02 NOTE — PLAN OF CARE
Problem: Chronic Conditions and Co-morbidities  Goal: Patient's chronic conditions and co-morbidity symptoms are monitored and maintained or improved  5/2/2025 0759 by Kirsten Troncoso, RN  Outcome: Progressing  5/2/2025 0115 by Irene Lozano, RN  Outcome: Progressing

## 2025-05-03 VITALS
DIASTOLIC BLOOD PRESSURE: 98 MMHG | HEIGHT: 68 IN | HEART RATE: 86 BPM | BODY MASS INDEX: 40.47 KG/M2 | OXYGEN SATURATION: 94 % | TEMPERATURE: 98.4 F | WEIGHT: 267 LBS | SYSTOLIC BLOOD PRESSURE: 140 MMHG | RESPIRATION RATE: 19 BRPM

## 2025-05-03 PROBLEM — E87.1 HYPONATREMIA: Status: RESOLVED | Noted: 2025-04-30 | Resolved: 2025-05-03

## 2025-05-03 PROBLEM — A41.9 SEPSIS (HCC): Status: RESOLVED | Noted: 2025-04-30 | Resolved: 2025-05-03

## 2025-05-03 PROBLEM — N39.0 UTI (URINARY TRACT INFECTION): Status: RESOLVED | Noted: 2025-04-30 | Resolved: 2025-05-03

## 2025-05-03 PROBLEM — J15.9 BACTERIAL PNEUMONIA: Status: RESOLVED | Noted: 2025-04-30 | Resolved: 2025-05-03

## 2025-05-03 PROBLEM — R31.9 HEMATURIA: Status: RESOLVED | Noted: 2025-04-30 | Resolved: 2025-05-03

## 2025-05-03 LAB
ERYTHROCYTE [DISTWIDTH] IN BLOOD BY AUTOMATED COUNT: 14.7 % (ref 11.9–14.6)
HCT VFR BLD AUTO: 40.4 % (ref 41.1–50.3)
HGB BLD-MCNC: 14 G/DL (ref 13.6–17.2)
MCH RBC QN AUTO: 24.9 PG (ref 26.1–32.9)
MCHC RBC AUTO-ENTMCNC: 34.7 G/DL (ref 31.4–35)
MCV RBC AUTO: 71.9 FL (ref 82–102)
NRBC # BLD: 0.02 K/UL (ref 0–0.2)
PLATELET # BLD AUTO: 114 K/UL (ref 150–450)
PMV BLD AUTO: 12.5 FL (ref 9.4–12.3)
RBC # BLD AUTO: 5.62 M/UL (ref 4.23–5.6)
WBC # BLD AUTO: 7.8 K/UL (ref 4.3–11.1)

## 2025-05-03 PROCEDURE — 36415 COLL VENOUS BLD VENIPUNCTURE: CPT

## 2025-05-03 PROCEDURE — 85027 COMPLETE CBC AUTOMATED: CPT

## 2025-05-03 PROCEDURE — 2500000003 HC RX 250 WO HCPCS: Performed by: INTERNAL MEDICINE

## 2025-05-03 PROCEDURE — 6370000000 HC RX 637 (ALT 250 FOR IP): Performed by: PHYSICIAN ASSISTANT

## 2025-05-03 PROCEDURE — 6370000000 HC RX 637 (ALT 250 FOR IP): Performed by: NURSE PRACTITIONER

## 2025-05-03 PROCEDURE — 6370000000 HC RX 637 (ALT 250 FOR IP): Performed by: INTERNAL MEDICINE

## 2025-05-03 RX ORDER — LACTOBACILLUS RHAMNOSUS GG 10B CELL
1 CAPSULE ORAL
Status: DISCONTINUED | OUTPATIENT
Start: 2025-05-04 | End: 2025-05-03 | Stop reason: HOSPADM

## 2025-05-03 RX ORDER — LACTOBACILLUS RHAMNOSUS GG 10B CELL
1 CAPSULE ORAL
Qty: 14 CAPSULE | Refills: 0 | Status: SHIPPED | OUTPATIENT
Start: 2025-05-04 | End: 2025-05-18

## 2025-05-03 RX ORDER — CIPROFLOXACIN 500 MG/1
500 TABLET, FILM COATED ORAL EVERY 12 HOURS SCHEDULED
Qty: 20 TABLET | Refills: 0 | Status: SHIPPED | OUTPATIENT
Start: 2025-05-03 | End: 2025-05-13

## 2025-05-03 RX ORDER — CIPROFLOXACIN 500 MG/1
500 TABLET, FILM COATED ORAL EVERY 12 HOURS SCHEDULED
Status: DISCONTINUED | OUTPATIENT
Start: 2025-05-03 | End: 2025-05-03 | Stop reason: HOSPADM

## 2025-05-03 RX ADMIN — CIPROFLOXACIN HYDROCHLORIDE 500 MG: 500 TABLET, FILM COATED ORAL at 11:24

## 2025-05-03 RX ADMIN — ASPIRIN 81 MG: 81 TABLET, CHEWABLE ORAL at 08:12

## 2025-05-03 RX ADMIN — PREGABALIN 100 MG: 100 CAPSULE ORAL at 08:12

## 2025-05-03 RX ADMIN — CYCLOBENZAPRINE 10 MG: 10 TABLET, FILM COATED ORAL at 08:12

## 2025-05-03 RX ADMIN — SODIUM CHLORIDE, PRESERVATIVE FREE 10 ML: 5 INJECTION INTRAVENOUS at 08:13

## 2025-05-03 ASSESSMENT — PAIN DESCRIPTION - ORIENTATION: ORIENTATION: RIGHT;LEFT

## 2025-05-03 ASSESSMENT — PAIN SCALES - GENERAL
PAINLEVEL_OUTOF10: 0
PAINLEVEL_OUTOF10: 9

## 2025-05-03 ASSESSMENT — PAIN DESCRIPTION - DESCRIPTORS: DESCRIPTORS: ACHING

## 2025-05-03 NOTE — CARE COORDINATION
MSN, CM:  patient to be discharged home today with [facility]  and [facility].  Patient and family agree with this discharge plan.  Patient has met all milestones for this admission.  Patient also set up for PCP with [facility].  Family to transport patient home.       05/03/25 [id number]   Service Assessment   Patient Orientation Alert and Oriented   Cognition Alert   Services At/After Discharge   Transition of Care Consult (CM Consult) Home Health;Other  ([facility] and [facility])   Internal Home Health No   Reason Outside Agency Chosen Unable to staff case   Services At/After Discharge PT;OT;Nursing services   Mode of Transport at Discharge Other (see comment)  (family to transport)   Confirm Follow Up Transport Family   Condition of Participation: Discharge Planning   The Plan for Transition of Care is related to the following treatment goals: Home Health and Palliative Care   The Patient and/or Patient Representative was provided with a Choice of Provider? Patient   The Patient and/Or Patient Representative agree with the Discharge Plan? Yes   Freedom of Choice list was provided with basic dialogue that supports the patient's individualized plan of care/goals, treatment preferences, and shares the quality data associated with the providers?  Yes

## 2025-05-03 NOTE — PROGRESS NOTES
Shift report received from previous RN. Pt in bed Respirations even and unlabored on room air. No signs of distress, pt denies any pain. Pt instructed to utilize call light if any assistance was needed. Pt verbalized understanding. No further needs stated by pt.

## 2025-05-03 NOTE — PROGRESS NOTES
Discharge education had been review with patient. Understanding of all discharge instructions both written and verbal have been expressed by  patient.  Discharge medications reviewed with the patient and appropriate educational materials and side effects teaching were provided in written form.     All new medications have been sent to patient's pharmacy of record.    All PIV's removed with dressing applied.

## 2025-05-03 NOTE — DISCHARGE SUMMARY
Hospitalist Discharge Summary   Admit Date:  2025  2:18 AM   DC Note date: 5/3/2025  Name:  Duy Ray   Age:  54 y.o.  Sex:  male  :  1970   MRN:  [id number]   Room:  [facility]  PCP:  None, None    Presenting Complaint: Altered Mental Status and Fever     Initial Admission Diagnosis: Sepsis (HCC) [A41.9]  Pneumonia of left lower lobe due to infectious organism [J18.9]  Sepsis, due to unspecified organism, unspecified whether acute organ dysfunction present (HCC) [A41.9]     Problem List for this Hospitalization (present on admission):    Principal Problem (Resolved):    Sepsis (HCC)  Active Problems:    Hypertension    Neuralgia and neuritis, unspecified    Thrombocytopenia    Elevated AST (SGOT)  Resolved Problems:    Bacterial pneumonia    Hyponatremia    UTI (urinary tract infection)    Hematuria      Hospital Course:  Pt is a 55 yo male with PMHx of CVA (2024), tobacco use, neuropathy, obesity who presented to ED on  for progressive malaise x several days.  He was found to meet severe sepsis criteria with Tmax 101.1, RR 33, , WBC 12.6, SBP < 100.  CXR showed pulmonary vascular congestion and left base opacity.  UA also with possible infection.  He was started on Vanc/Zosyn in ED.       Blood cultures drawn on admission were both positive for Morganella. Workup did not reveal a clear source, as urine cx was negative and PNA was difficult to dx on CXR. He had some R great toe swelling, but per ID, no evidence of infection. Repeat blood cxs were negative x 2 days. He remained afebrile and without complaint. Denies abdominal pain, denied diarrhea, no N/V. Only had c/o chronic pain, not changed from baseline. Given his cultures were negative, stable to discharge home on PO antibiotics to complete 14 day course. Pt verbalized understanding about abx compliance. D/C to home on PO Cipro. Pt does not have a PCP, as his retired. A referral was made to [facility] primary care in [place].  distress  Head:  Normocephalic, atraumatic  Eyes:  Sclerae appear normal.  Pupils equally round.    HENT:  Nares appear normal, no drainage.  Moist mucous membranes  Neck:  No restricted ROM.  Trachea midline  CV:   RRR.  No m/r/g.  No JVD  Lungs:   CTAB.  No wheezing, rhonchi, or rales.  Respirations even, unlabored  Abdomen:   Soft, nontender, nondistended.    Extremities: Warm and dry.   No edema.    Skin:     No rashes.  Normal coloration  Neuro:  CN II-XII grossly intact.  Psych:  Normal mood and affect.        Time spent in patient discharge and coordination 25 minutes.      Signed:  Jessie Petit PA-C

## 2025-05-03 NOTE — DISCHARGE INSTRUCTIONS
Please complete entire course of antibiotics. Your next dose after discharge is due tonight (5/3/25).    One of your liver numbers was elevated. Please hold taking your statin (cholesterol medication) until seen by new PCP.

## 2025-05-03 NOTE — PROGRESS NOTES
Pt refused dose of Rocephin due to \"burning\" feeling during administration. Pt mentioned desire for oral antibiotics. This RN offered to run medication with fluids for pt's comfort and pt refused still.  Awa Vanegas NP made aware via perfect serve.